# Patient Record
Sex: FEMALE | Race: WHITE | NOT HISPANIC OR LATINO | Employment: FULL TIME | URBAN - METROPOLITAN AREA
[De-identification: names, ages, dates, MRNs, and addresses within clinical notes are randomized per-mention and may not be internally consistent; named-entity substitution may affect disease eponyms.]

---

## 2023-04-02 DIAGNOSIS — M54.50 LOW BACK PAIN WITHOUT SCIATICA, UNSPECIFIED BACK PAIN LATERALITY, UNSPECIFIED CHRONICITY: Primary | ICD-10-CM

## 2023-04-03 RX ORDER — MELOXICAM 7.5 MG/1
TABLET ORAL
Qty: 30 TABLET | Refills: 0 | Status: SHIPPED | OUTPATIENT
Start: 2023-04-03 | End: 2023-06-27 | Stop reason: SDUPTHER

## 2023-06-03 LAB
ALANINE AMINOTRANSFERASE (SGPT) (U/L) IN SER/PLAS: 22 U/L (ref 7–45)
ALBUMIN (G/DL) IN SER/PLAS: 4.7 G/DL (ref 3.4–5)
ALKALINE PHOSPHATASE (U/L) IN SER/PLAS: 98 U/L (ref 33–136)
ANION GAP IN SER/PLAS: 16 MMOL/L (ref 10–20)
ASPARTATE AMINOTRANSFERASE (SGOT) (U/L) IN SER/PLAS: 18 U/L (ref 9–39)
BILIRUBIN TOTAL (MG/DL) IN SER/PLAS: 0.6 MG/DL (ref 0–1.2)
CALCIUM (MG/DL) IN SER/PLAS: 9.8 MG/DL (ref 8.6–10.3)
CARBON DIOXIDE, TOTAL (MMOL/L) IN SER/PLAS: 27 MMOL/L (ref 21–32)
CHLORIDE (MMOL/L) IN SER/PLAS: 104 MMOL/L (ref 98–107)
CHOLESTEROL (MG/DL) IN SER/PLAS: 179 MG/DL (ref 0–199)
CHOLESTEROL IN HDL (MG/DL) IN SER/PLAS: 71.2 MG/DL
CHOLESTEROL/HDL RATIO: 2.5
CREATININE (MG/DL) IN SER/PLAS: 0.89 MG/DL (ref 0.5–1.05)
GFR FEMALE: 68 ML/MIN/1.73M2
GLUCOSE (MG/DL) IN SER/PLAS: 108 MG/DL (ref 74–99)
LDL: 86 MG/DL (ref 0–99)
POTASSIUM (MMOL/L) IN SER/PLAS: 4.7 MMOL/L (ref 3.5–5.3)
PROTEIN TOTAL: 7.7 G/DL (ref 6.4–8.2)
SODIUM (MMOL/L) IN SER/PLAS: 142 MMOL/L (ref 136–145)
TRIGLYCERIDE (MG/DL) IN SER/PLAS: 110 MG/DL (ref 0–149)
UREA NITROGEN (MG/DL) IN SER/PLAS: 17 MG/DL (ref 6–23)
VLDL: 22 MG/DL (ref 0–40)

## 2023-06-27 ENCOUNTER — OFFICE VISIT (OUTPATIENT)
Dept: PRIMARY CARE | Facility: CLINIC | Age: 73
End: 2023-06-27
Payer: MEDICARE

## 2023-06-27 VITALS
HEIGHT: 64 IN | BODY MASS INDEX: 29.74 KG/M2 | HEART RATE: 70 BPM | TEMPERATURE: 98.1 F | WEIGHT: 174.2 LBS | OXYGEN SATURATION: 93 % | DIASTOLIC BLOOD PRESSURE: 75 MMHG | SYSTOLIC BLOOD PRESSURE: 111 MMHG

## 2023-06-27 DIAGNOSIS — M54.50 CHRONIC LEFT-SIDED LOW BACK PAIN WITHOUT SCIATICA: ICD-10-CM

## 2023-06-27 DIAGNOSIS — M54.50 LOW BACK PAIN WITHOUT SCIATICA, UNSPECIFIED BACK PAIN LATERALITY, UNSPECIFIED CHRONICITY: ICD-10-CM

## 2023-06-27 DIAGNOSIS — G89.29 CHRONIC LEFT-SIDED LOW BACK PAIN WITHOUT SCIATICA: ICD-10-CM

## 2023-06-27 DIAGNOSIS — E66.3 OVERWEIGHT (BMI 25.0-29.9): ICD-10-CM

## 2023-06-27 DIAGNOSIS — M25.552 PAIN OF LEFT HIP: ICD-10-CM

## 2023-06-27 DIAGNOSIS — I10 BENIGN ESSENTIAL HYPERTENSION: ICD-10-CM

## 2023-06-27 DIAGNOSIS — E04.1 SOLITARY THYROID NODULE: ICD-10-CM

## 2023-06-27 DIAGNOSIS — R73.01 IMPAIRED FASTING GLUCOSE: Primary | ICD-10-CM

## 2023-06-27 DIAGNOSIS — E78.5 DYSLIPIDEMIA, GOAL LDL BELOW 130: ICD-10-CM

## 2023-06-27 PROBLEM — H61.23 IMPACTED CERUMEN OF BOTH EARS: Status: ACTIVE | Noted: 2023-06-27

## 2023-06-27 PROBLEM — H53.2 DIPLOPIA: Status: ACTIVE | Noted: 2023-06-27

## 2023-06-27 PROBLEM — M25.362 OTHER INSTABILITY, LEFT KNEE: Status: ACTIVE | Noted: 2023-06-27

## 2023-06-27 PROBLEM — M67.52 SYNOVIAL PLICA SYNDROME OF LEFT KNEE: Status: ACTIVE | Noted: 2023-06-27

## 2023-06-27 PROBLEM — R05.9 COUGH: Status: ACTIVE | Noted: 2023-06-27

## 2023-06-27 PROBLEM — H61.21 IMPACTED CERUMEN OF RIGHT EAR: Status: ACTIVE | Noted: 2023-06-27

## 2023-06-27 PROBLEM — H25.10 NUCLEAR SCLEROSIS: Status: ACTIVE | Noted: 2023-06-27

## 2023-06-27 PROBLEM — K21.9 GERD (GASTROESOPHAGEAL REFLUX DISEASE): Status: ACTIVE | Noted: 2023-06-27

## 2023-06-27 PROBLEM — M79.89 MASS OF SOFT TISSUE OF RIGHT UPPER EXTREMITY: Status: ACTIVE | Noted: 2023-06-27

## 2023-06-27 PROBLEM — J01.90 ACUTE SINUSITIS: Status: ACTIVE | Noted: 2023-06-27

## 2023-06-27 PROBLEM — G47.33 OBSTRUCTIVE SLEEP APNEA: Status: ACTIVE | Noted: 2023-06-27

## 2023-06-27 PROBLEM — M20.10 VALGUS DEFORMITY OF GREAT TOE: Status: ACTIVE | Noted: 2023-06-27

## 2023-06-27 PROBLEM — M71.22 SYNOVIAL CYST OF LEFT KNEE: Status: ACTIVE | Noted: 2023-06-27

## 2023-06-27 PROBLEM — S86.919A KNEE STRAIN: Status: ACTIVE | Noted: 2023-06-27

## 2023-06-27 PROBLEM — Z20.822 SUSPECTED COVID-19 VIRUS INFECTION: Status: ACTIVE | Noted: 2023-06-27

## 2023-06-27 PROBLEM — H04.129 DRY EYE SYNDROME: Status: ACTIVE | Noted: 2023-06-27
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
## 2023-06-27 PROBLEM — R06.83 SNORING: Status: ACTIVE | Noted: 2023-06-27

## 2023-06-27 PROBLEM — R19.5 POSITIVE COLORECTAL CANCER SCREENING USING COLOGUARD TEST: Status: ACTIVE | Noted: 2023-06-27

## 2023-06-27 PROBLEM — M25.562 KNEE PAIN, LEFT: Status: ACTIVE | Noted: 2023-06-27

## 2023-06-27 PROBLEM — R53.83 FATIGUE: Status: ACTIVE | Noted: 2023-06-27

## 2023-06-27 PROBLEM — M17.12 PRIMARY OSTEOARTHRITIS OF LEFT KNEE: Status: ACTIVE | Noted: 2023-06-27

## 2023-06-27 PROBLEM — F41.9 ANXIETY: Status: ACTIVE | Noted: 2023-06-27

## 2023-06-27 PROBLEM — M54.32 SCIATICA OF LEFT SIDE: Status: ACTIVE | Noted: 2023-06-27

## 2023-06-27 PROBLEM — E87.5 HYPERKALEMIA: Status: ACTIVE | Noted: 2023-06-27

## 2023-06-27 PROBLEM — M79.676 TOE PAIN, CHRONIC: Status: ACTIVE | Noted: 2023-06-27

## 2023-06-27 PROBLEM — E04.2 MULTINODULAR THYROID: Status: ACTIVE | Noted: 2023-06-27

## 2023-06-27 PROBLEM — H91.93 BILATERAL HEARING LOSS: Status: ACTIVE | Noted: 2023-06-27

## 2023-06-27 PROBLEM — K63.5 COLON POLYP: Status: ACTIVE | Noted: 2023-06-27

## 2023-06-27 PROBLEM — E87.5 HYPERKALEMIA: Status: RESOLVED | Noted: 2023-06-27 | Resolved: 2023-06-27

## 2023-06-27 PROBLEM — R94.120 ABNORMAL OTOACOUSTIC EMISSIONS TEST: Status: ACTIVE | Noted: 2023-06-27

## 2023-06-27 PROBLEM — D12.6 TUBULAR ADENOMA OF COLON: Status: ACTIVE | Noted: 2023-06-27

## 2023-06-27 PROBLEM — E53.8 VITAMIN B12 DEFICIENCY: Status: ACTIVE | Noted: 2023-06-27

## 2023-06-27 PROBLEM — J02.9 SORE THROAT: Status: ACTIVE | Noted: 2023-06-27

## 2023-06-27 PROBLEM — B35.1 NAIL FUNGUS: Status: ACTIVE | Noted: 2023-06-27

## 2023-06-27 PROBLEM — H61.22 IMPACTED CERUMEN OF LEFT EAR: Status: ACTIVE | Noted: 2023-06-27

## 2023-06-27 PROBLEM — H93.8X3 SENSATION OF PLUGGED EAR ON BOTH SIDES: Status: ACTIVE | Noted: 2023-06-27

## 2023-06-27 LAB — POC HEMOGLOBIN A1C: 5.9 % (ref 4.2–6.5)

## 2023-06-27 PROCEDURE — 1160F RVW MEDS BY RX/DR IN RCRD: CPT | Performed by: INTERNAL MEDICINE

## 2023-06-27 PROCEDURE — 1157F ADVNC CARE PLAN IN RCRD: CPT | Performed by: INTERNAL MEDICINE

## 2023-06-27 PROCEDURE — 3078F DIAST BP <80 MM HG: CPT | Performed by: INTERNAL MEDICINE

## 2023-06-27 PROCEDURE — 99214 OFFICE O/P EST MOD 30 MIN: CPT | Performed by: INTERNAL MEDICINE

## 2023-06-27 PROCEDURE — 83036 HEMOGLOBIN GLYCOSYLATED A1C: CPT | Performed by: INTERNAL MEDICINE

## 2023-06-27 PROCEDURE — 3008F BODY MASS INDEX DOCD: CPT | Performed by: INTERNAL MEDICINE

## 2023-06-27 PROCEDURE — 3074F SYST BP LT 130 MM HG: CPT | Performed by: INTERNAL MEDICINE

## 2023-06-27 PROCEDURE — 1159F MED LIST DOCD IN RCRD: CPT | Performed by: INTERNAL MEDICINE

## 2023-06-27 PROCEDURE — 1036F TOBACCO NON-USER: CPT | Performed by: INTERNAL MEDICINE

## 2023-06-27 RX ORDER — ATORVASTATIN CALCIUM 20 MG/1
20 TABLET, FILM COATED ORAL DAILY
COMMUNITY
End: 2023-12-12

## 2023-06-27 RX ORDER — LISINOPRIL 20 MG/1
20 TABLET ORAL DAILY
COMMUNITY
End: 2023-12-20

## 2023-06-27 RX ORDER — TRIAMCINOLONE ACETONIDE 5 MG/G
CREAM TOPICAL
COMMUNITY
Start: 2019-05-15 | End: 2023-10-11

## 2023-06-27 RX ORDER — ASPIRIN 81 MG/1
81 TABLET ORAL DAILY
COMMUNITY
Start: 2017-09-12

## 2023-06-27 RX ORDER — TRIAMCINOLONE ACETONIDE 0.25 MG/G
1 CREAM TOPICAL
COMMUNITY
Start: 2021-12-14

## 2023-06-27 RX ORDER — MELOXICAM 7.5 MG/1
TABLET ORAL
Qty: 30 TABLET | Refills: 2 | Status: SHIPPED | OUTPATIENT
Start: 2023-06-27 | End: 2023-11-15

## 2023-06-27 ASSESSMENT — PATIENT HEALTH QUESTIONNAIRE - PHQ9
2. FEELING DOWN, DEPRESSED OR HOPELESS: NOT AT ALL
1. LITTLE INTEREST OR PLEASURE IN DOING THINGS: NOT AT ALL
SUM OF ALL RESPONSES TO PHQ9 QUESTIONS 1 AND 2: 0

## 2023-06-27 NOTE — ASSESSMENT & PLAN NOTE
Stretching exercise to back as per provided handout.  Take Tylenol and use OTC Lidocaine patch.  Order XRAY.  Can take Meloxicam infrequently,can cause kidney damage,can cause  stomach ulcer and can raise BP.

## 2023-07-03 NOTE — RESULT ENCOUNTER NOTE
Lumbar spine XRAY show degenerative disc disease at multi level and scoliosis,keep same plan as discussed at recent visit,call if pain worsened.  There are retained stools noted,you can add OTC daialy Miralax if you are having constipation and follow high fiber diet.

## 2023-10-06 ENCOUNTER — APPOINTMENT (OUTPATIENT)
Dept: OTOLARYNGOLOGY | Facility: CLINIC | Age: 73
End: 2023-10-06
Payer: MEDICARE

## 2023-10-10 ENCOUNTER — TELEMEDICINE (OUTPATIENT)
Dept: OTOLARYNGOLOGY | Facility: CLINIC | Age: 73
End: 2023-10-10
Payer: MEDICARE

## 2023-10-10 ENCOUNTER — HOSPITAL ENCOUNTER (OUTPATIENT)
Dept: RADIOLOGY | Facility: HOSPITAL | Age: 73
Discharge: HOME | End: 2023-10-10
Payer: MEDICARE

## 2023-10-10 VITALS
HEART RATE: 70 BPM | OXYGEN SATURATION: 95 % | DIASTOLIC BLOOD PRESSURE: 77 MMHG | SYSTOLIC BLOOD PRESSURE: 135 MMHG | RESPIRATION RATE: 18 BRPM

## 2023-10-10 DIAGNOSIS — E04.1 THYROID NODULE GREATER THAN OR EQUAL TO 1 CM IN DIAMETER INCIDENTALLY NOTED ON IMAGING STUDY: ICD-10-CM

## 2023-10-10 DIAGNOSIS — H90.3 SENSORINEURAL HEARING LOSS (SNHL) OF BOTH EARS: Primary | ICD-10-CM

## 2023-10-10 DIAGNOSIS — H91.13 PRESBYCUSIS OF BOTH EARS: ICD-10-CM

## 2023-10-10 DIAGNOSIS — H91.93 HEARING DIFFICULTY OF BOTH EARS: ICD-10-CM

## 2023-10-10 PROCEDURE — 60100 BIOPSY OF THYROID: CPT | Performed by: NURSE PRACTITIONER

## 2023-10-10 PROCEDURE — 76942 ECHO GUIDE FOR BIOPSY: CPT

## 2023-10-10 PROCEDURE — 88173 CYTOPATH EVAL FNA REPORT: CPT | Performed by: PATHOLOGY

## 2023-10-10 PROCEDURE — 99213 OFFICE O/P EST LOW 20 MIN: CPT

## 2023-10-10 PROCEDURE — 88112 CYTOPATH CELL ENHANCE TECH: CPT | Performed by: PATHOLOGY

## 2023-10-10 PROCEDURE — 88173 CYTOPATH EVAL FNA REPORT: CPT | Mod: TC,59 | Performed by: STUDENT IN AN ORGANIZED HEALTH CARE EDUCATION/TRAINING PROGRAM

## 2023-10-10 PROCEDURE — 88173 CYTOPATH EVAL FNA REPORT: CPT | Mod: TC,91 | Performed by: STUDENT IN AN ORGANIZED HEALTH CARE EDUCATION/TRAINING PROGRAM

## 2023-10-10 NOTE — PROGRESS NOTES
Subjective   Patient ID: Brigitte Lovell is a 72 y.o. female.    HPI  Brigitte presents for a virtual visit to review the results of the audiogram performed on 9/27/2023. Audiogram evaluation was indicated for patient's complaint of increased difficulty in discriminating speech when her  was speaking in another room in the house, discussed at our last visit.     Review of Systems  ENT problem-focused review of systems is negative except as noted per HPI.    Objective   Physical Exam  CONSTITUTIONAL: No acute distress, normal facial features; No fever; no chills  VOICE: No hoarseness or other audible abnormality  RESPIRATION: Breathing comfortably, no stridor; normal breathing effort  CV: No cyanosis visible on the face and neck area  EYES: Pupils equal and round; no erythema; conjunctiva clear; sclera white  NEURO: Alert and oriented, able to raise eyebrows symmetrical bilateral, smile with no facial droop, able to swallow  HEAD AND FACE: Symmetric facial features, no masses or lesions Right ear examination: External ear normal. Left ear examination: External ear normal.  NOSE: External nose midline  ORAL CAVITY: No lesions of external lips; uvula is midline; tongue with good mobility; no gross mass in oral cavity; mucosa appears pink  NECK/LYMPH: No obvious deformity or lesions; trachea is midline PSYCH: Alert and oriented with appropriate mood and affect    RESULTS:   I personally reviewed the patient's audiogram today from 9/27/2023 which showed: Normal hearing across all frequencies in bilateral ears through 4000Hz, down sloping to mild bilateral sensorineural hearing loss. Normal tympanogram bilaterally. Excellent word discrimination bilaterally. Acoustic reflexes present right ipsilateral, and left ipsilateral.    Assessment/Plan   -Bilateral sensorineural hearing loss  -Presbycusis of both ears  -Hearing difficulty of both ears    ASSESSMENT:  Patient presents for virtual video visit to review audiogram  results, which revealed mild high-frequency sensorineural hearing loss bilaterally. I personally reviewed audiogram results with the patient. Reassurance provided that audiogram results are most consistent with presbycusis, and at this time, hearing amplification devices are not necessary.     PLAN:  - I recommended that patient modifies communication strategies at home for better communication, such as turning up the volume or closer proximity for conversations.   - I counseled patient to repeat audiogram and follow-up if she experiences any new/worsening otologic symptoms. All questions answered to patient's satisfaction.   -Follow up in 6 months for routine removal of cerumen impaction, as previously scheduled. May follow-up sooner if needed.

## 2023-10-10 NOTE — Clinical Note
Procedure end, Bandage and ice pack applied neck area. No bleeding noted. Patient tolerated procedure without incident.

## 2023-10-10 NOTE — Clinical Note
Patient denies complains at this time. Home going instructions reviewed with patient and patient verbalized understanding.

## 2023-10-11 ENCOUNTER — LAB (OUTPATIENT)
Dept: LAB | Facility: LAB | Age: 73
End: 2023-10-11
Payer: MEDICARE

## 2023-10-11 ENCOUNTER — OFFICE VISIT (OUTPATIENT)
Dept: PRIMARY CARE | Facility: CLINIC | Age: 73
End: 2023-10-11
Payer: MEDICARE

## 2023-10-11 VITALS
OXYGEN SATURATION: 92 % | TEMPERATURE: 97.2 F | HEART RATE: 63 BPM | HEIGHT: 64 IN | WEIGHT: 174.8 LBS | DIASTOLIC BLOOD PRESSURE: 72 MMHG | SYSTOLIC BLOOD PRESSURE: 126 MMHG | BODY MASS INDEX: 29.84 KG/M2

## 2023-10-11 DIAGNOSIS — K63.5 POLYP OF COLON, UNSPECIFIED PART OF COLON, UNSPECIFIED TYPE: ICD-10-CM

## 2023-10-11 DIAGNOSIS — E78.5 DYSLIPIDEMIA, GOAL LDL BELOW 130: ICD-10-CM

## 2023-10-11 DIAGNOSIS — E04.2 MULTINODULAR THYROID: ICD-10-CM

## 2023-10-11 DIAGNOSIS — E04.1 SOLITARY THYROID NODULE: ICD-10-CM

## 2023-10-11 DIAGNOSIS — Z00.00 HEALTHCARE MAINTENANCE: ICD-10-CM

## 2023-10-11 DIAGNOSIS — M51.36 LUMBAR DEGENERATIVE DISC DISEASE: ICD-10-CM

## 2023-10-11 DIAGNOSIS — E04.2 MULTINODULAR THYROID: Primary | ICD-10-CM

## 2023-10-11 DIAGNOSIS — I10 BENIGN ESSENTIAL HYPERTENSION: ICD-10-CM

## 2023-10-11 PROBLEM — L73.9 NASAL FOLLICULITIS: Status: ACTIVE | Noted: 2023-10-11

## 2023-10-11 PROBLEM — J34.89 NASAL DISCOMFORT: Status: ACTIVE | Noted: 2023-10-11

## 2023-10-11 PROBLEM — R49.0 HOARSENESS OF VOICE: Status: ACTIVE | Noted: 2023-10-11

## 2023-10-11 PROBLEM — H90.3 BILATERAL HIGH FREQUENCY SENSORINEURAL HEARING LOSS: Status: ACTIVE | Noted: 2023-10-11

## 2023-10-11 PROBLEM — H91.93 HEARING DIFFICULTY OF BOTH EARS: Status: ACTIVE | Noted: 2023-10-11

## 2023-10-11 PROBLEM — M51.369 LUMBAR DEGENERATIVE DISC DISEASE: Status: ACTIVE | Noted: 2023-10-11

## 2023-10-11 LAB
LABORATORY COMMENT REPORT: NORMAL
LABORATORY COMMENT REPORT: NORMAL
PATH REPORT.FINAL DX SPEC: NORMAL
PATH REPORT.GROSS SPEC: NORMAL
PATH REPORT.TOTAL CANCER: NORMAL
TSH SERPL-ACNC: 0.89 MIU/L (ref 0.44–3.98)

## 2023-10-11 PROCEDURE — 1159F MED LIST DOCD IN RCRD: CPT | Performed by: INTERNAL MEDICINE

## 2023-10-11 PROCEDURE — 90662 IIV NO PRSV INCREASED AG IM: CPT | Performed by: INTERNAL MEDICINE

## 2023-10-11 PROCEDURE — 3074F SYST BP LT 130 MM HG: CPT | Performed by: INTERNAL MEDICINE

## 2023-10-11 PROCEDURE — 3008F BODY MASS INDEX DOCD: CPT | Performed by: INTERNAL MEDICINE

## 2023-10-11 PROCEDURE — 84443 ASSAY THYROID STIM HORMONE: CPT

## 2023-10-11 PROCEDURE — 99214 OFFICE O/P EST MOD 30 MIN: CPT | Performed by: INTERNAL MEDICINE

## 2023-10-11 PROCEDURE — 1126F AMNT PAIN NOTED NONE PRSNT: CPT | Performed by: INTERNAL MEDICINE

## 2023-10-11 PROCEDURE — 36415 COLL VENOUS BLD VENIPUNCTURE: CPT

## 2023-10-11 PROCEDURE — 3078F DIAST BP <80 MM HG: CPT | Performed by: INTERNAL MEDICINE

## 2023-10-11 PROCEDURE — 1160F RVW MEDS BY RX/DR IN RCRD: CPT | Performed by: INTERNAL MEDICINE

## 2023-10-11 PROCEDURE — 1036F TOBACCO NON-USER: CPT | Performed by: INTERNAL MEDICINE

## 2023-10-11 PROCEDURE — G0008 ADMIN INFLUENZA VIRUS VAC: HCPCS | Performed by: INTERNAL MEDICINE

## 2023-10-11 ASSESSMENT — ENCOUNTER SYMPTOMS
CARDIOVASCULAR NEGATIVE: 1
NEUROLOGICAL NEGATIVE: 1
PSYCHIATRIC NEGATIVE: 1
FATIGUE: 1
HEMATOLOGIC/LYMPHATIC NEGATIVE: 1
EYES NEGATIVE: 1
BACK PAIN: 1
GASTROINTESTINAL NEGATIVE: 1
RESPIRATORY NEGATIVE: 1

## 2023-10-11 ASSESSMENT — PATIENT HEALTH QUESTIONNAIRE - PHQ9
1. LITTLE INTEREST OR PLEASURE IN DOING THINGS: NOT AT ALL
2. FEELING DOWN, DEPRESSED OR HOPELESS: NOT AT ALL
SUM OF ALL RESPONSES TO PHQ9 QUESTIONS 1 AND 2: 0

## 2023-10-11 NOTE — ASSESSMENT & PLAN NOTE
Patient had thyroid biopsy on October 10, 2023 report still pending we will check a TSH and she will keep her follow-up appointment with Dr. Carlos Aguilar.

## 2023-10-11 NOTE — ASSESSMENT & PLAN NOTE
She will be due for colonoscopy March 2026.  She will continue high-fiber diet and Dulcolax as needed.

## 2023-10-11 NOTE — ASSESSMENT & PLAN NOTE
Continue stretching exercise.  Continue meloxicam but take infrequently with food and monitor blood pressure while taking it.

## 2023-10-11 NOTE — PROGRESS NOTES
"Subjective   Patient ID: Brigitte Lovell is a 72 y.o. female who presents for 4 month follow up.    This is a 72 year old female here to f/u on HTN,HLD ,IFG,sleep apnea,OA,GERD,h/o colon adenoma,pt sees,derm ,she now see Dr Carlos Aguilar for her thyroid nodule and had Bx yesterday at [facility],Bx report pending.  has been exercising regularly.no CP,SOB.  In past,she saw nutritionist and lost some weight.  In past she saw DR Blue,also saw someone at [facility],she has cataract being monitored..  she c/o fatigue.had sleep studies in 2016 and was \"borderline abnormal\". she self stopped using her C PAP machine,she could not tolerate it,she   she takes her meds regularly.    She c/o her usual pain left low back/left posterior hip x 2 months,on Mobic  with partial improvement,sometimes she takes Advil dual action ,no leg pain or numbness,no injury. Her XRAY showed Spondylolythesis,grade 1 and stools,has been managing with Dulcolax and high fiber diet,she did not like Miralax)caused pasty stools).  Last colonoscopy 3/2021,next in 5 years due to polyps.              Review of Systems   Constitutional:  Positive for fatigue.   HENT: Negative.     Eyes: Negative.    Respiratory: Negative.     Cardiovascular: Negative.    Gastrointestinal: Negative.    Genitourinary: Negative.    Musculoskeletal:  Positive for back pain.   Neurological: Negative.    Hematological: Negative.    Psychiatric/Behavioral: Negative.         Objective   /72 (BP Location: Left arm, Patient Position: Sitting, BP Cuff Size: Adult)   Pulse 63   Temp 36.2 °C (97.2 °F) (Temporal)   Ht 1.626 m (5' 4\")   Wt 79.3 kg (174 lb 12.8 oz)   SpO2 92%   BMI 30.00 kg/m²     Physical Exam  Vitals reviewed.   Constitutional:       Appearance: Normal appearance.   HENT:      Head: Normocephalic and atraumatic.      Right Ear: Tympanic membrane and ear canal normal.      Left Ear: Tympanic membrane and ear canal normal.   Eyes:      Extraocular Movements: " Extraocular movements intact.      Pupils: Pupils are equal, round, and reactive to light.   Cardiovascular:      Rate and Rhythm: Normal rate and regular rhythm.      Pulses: Normal pulses.      Heart sounds: Normal heart sounds. No murmur heard.  Pulmonary:      Effort: Pulmonary effort is normal.      Breath sounds: Normal breath sounds.   Abdominal:      General: Abdomen is flat. Bowel sounds are normal.      Palpations: Abdomen is soft.   Musculoskeletal:         General: Normal range of motion.      Cervical back: Normal range of motion and neck supple.      Right lower leg: No edema.      Left lower leg: No edema.   Neurological:      General: No focal deficit present.      Mental Status: She is alert and oriented to person, place, and time.   Psychiatric:         Mood and Affect: Mood normal.         Assessment/Plan   Problem List Items Addressed This Visit             ICD-10-CM    Benign essential hypertension I10     Stable on current medication continue same .  high-dose flu vaccine given today .  she will return back for Medicare wellness in February.         Colon polyp K63.5     She will be due for colonoscopy March 2026.  She will continue high-fiber diet and Dulcolax as needed.         Dyslipidemia, goal LDL below 130 E78.5     Stable on low-fat diet and statin plan to order labs for February 2024.         Multinodular thyroid - Primary E04.2    Relevant Orders    TSH with reflex to Free T4 if abnormal    Solitary thyroid nodule E04.1     Patient had thyroid biopsy on October 10, 2023 report still pending we will check a TSH and she will keep her follow-up appointment with Dr. Carlos Aguilar.         Healthcare maintenance Z00.00    Relevant Orders    Flu vaccine, quadrivalent, high-dose, preservative free, age 65y+ (FLUZONE) (Completed)    Lumbar degenerative disc disease M51.36     Continue stretching exercise.  Continue meloxicam but take infrequently with food and monitor blood pressure while taking  it.

## 2023-10-11 NOTE — ASSESSMENT & PLAN NOTE
Stable on current medication continue same .  high-dose flu vaccine given today .  she will return back for Medicare wellness in February.

## 2023-11-14 ENCOUNTER — TELEPHONE (OUTPATIENT)
Dept: ENDOCRINOLOGY | Facility: CLINIC | Age: 73
End: 2023-11-14
Payer: MEDICARE

## 2023-11-14 DIAGNOSIS — E04.2 MULTINODULAR THYROID: ICD-10-CM

## 2023-11-14 NOTE — TELEPHONE ENCOUNTER
Brigitte called asking if she should be making a follow-up visit with you after her initial visit in October?  Please advise.  She did'nt hear from us about the TSH but Dr. Jackson ordered it.

## 2023-11-15 DIAGNOSIS — M54.50 LOW BACK PAIN WITHOUT SCIATICA, UNSPECIFIED BACK PAIN LATERALITY, UNSPECIFIED CHRONICITY: ICD-10-CM

## 2023-11-15 RX ORDER — MELOXICAM 7.5 MG/1
TABLET ORAL
Qty: 30 TABLET | Refills: 2 | Status: SHIPPED | OUTPATIENT
Start: 2023-11-15

## 2023-12-12 DIAGNOSIS — E78.5 DYSLIPIDEMIA, GOAL LDL BELOW 130: Primary | ICD-10-CM

## 2023-12-12 RX ORDER — ATORVASTATIN CALCIUM 20 MG/1
20 TABLET, FILM COATED ORAL DAILY
Qty: 90 TABLET | Refills: 0 | Status: SHIPPED | OUTPATIENT
Start: 2023-12-12 | End: 2024-03-25

## 2023-12-19 DIAGNOSIS — I10 BENIGN ESSENTIAL HYPERTENSION: Primary | ICD-10-CM

## 2023-12-20 RX ORDER — LISINOPRIL 20 MG/1
20 TABLET ORAL DAILY
Qty: 90 TABLET | Refills: 2 | Status: SHIPPED | OUTPATIENT
Start: 2023-12-20

## 2024-01-04 ENCOUNTER — TELEPHONE (OUTPATIENT)
Dept: OTOLARYNGOLOGY | Facility: CLINIC | Age: 74
End: 2024-01-04
Payer: MEDICARE

## 2024-01-05 ENCOUNTER — TELEPHONE (OUTPATIENT)
Dept: PRIMARY CARE | Facility: CLINIC | Age: 74
End: 2024-01-05
Payer: MEDICARE

## 2024-01-05 DIAGNOSIS — R05.1 ACUTE COUGH: Primary | ICD-10-CM

## 2024-01-05 RX ORDER — BENZONATATE 200 MG/1
200 CAPSULE ORAL 3 TIMES DAILY PRN
Qty: 42 CAPSULE | Refills: 0 | Status: SHIPPED | OUTPATIENT
Start: 2024-01-05 | End: 2024-02-04

## 2024-01-05 NOTE — TELEPHONE ENCOUNTER
Patient called and said that she tested positive for COVID today and she has a cough. Patient said that the last time she had COVID, you prescribed her Benzonatate and is wondering if this can be prescribed again? Please advise.

## 2024-02-27 ENCOUNTER — APPOINTMENT (OUTPATIENT)
Dept: PRIMARY CARE | Facility: CLINIC | Age: 74
End: 2024-02-27
Payer: MEDICARE

## 2024-03-06 ENCOUNTER — OFFICE VISIT (OUTPATIENT)
Dept: PRIMARY CARE | Facility: CLINIC | Age: 74
End: 2024-03-06
Payer: MEDICARE

## 2024-03-06 VITALS
BODY MASS INDEX: 30.02 KG/M2 | DIASTOLIC BLOOD PRESSURE: 77 MMHG | WEIGHT: 180.2 LBS | OXYGEN SATURATION: 96 % | HEIGHT: 65 IN | SYSTOLIC BLOOD PRESSURE: 115 MMHG | HEART RATE: 67 BPM

## 2024-03-06 DIAGNOSIS — E78.5 DYSLIPIDEMIA, GOAL LDL BELOW 130: ICD-10-CM

## 2024-03-06 DIAGNOSIS — I10 BENIGN ESSENTIAL HYPERTENSION: ICD-10-CM

## 2024-03-06 DIAGNOSIS — K63.5 POLYP OF COLON, UNSPECIFIED PART OF COLON, UNSPECIFIED TYPE: ICD-10-CM

## 2024-03-06 DIAGNOSIS — Z00.00 ROUTINE GENERAL MEDICAL EXAMINATION AT HEALTH CARE FACILITY: ICD-10-CM

## 2024-03-06 DIAGNOSIS — Z12.31 OTHER SCREENING MAMMOGRAM: ICD-10-CM

## 2024-03-06 DIAGNOSIS — E04.2 MULTINODULAR THYROID: ICD-10-CM

## 2024-03-06 DIAGNOSIS — H91.93 BILATERAL HEARING LOSS, UNSPECIFIED HEARING LOSS TYPE: ICD-10-CM

## 2024-03-06 DIAGNOSIS — Z00.00 MEDICARE ANNUAL WELLNESS VISIT, SUBSEQUENT: Primary | ICD-10-CM

## 2024-03-06 DIAGNOSIS — Z00.00 ANNUAL PHYSICAL EXAM: ICD-10-CM

## 2024-03-06 PROBLEM — R19.5 POSITIVE COLORECTAL CANCER SCREENING USING COLOGUARD TEST: Status: RESOLVED | Noted: 2023-06-27 | Resolved: 2024-03-06

## 2024-03-06 PROCEDURE — 1170F FXNL STATUS ASSESSED: CPT | Performed by: INTERNAL MEDICINE

## 2024-03-06 PROCEDURE — 1159F MED LIST DOCD IN RCRD: CPT | Performed by: INTERNAL MEDICINE

## 2024-03-06 PROCEDURE — G0439 PPPS, SUBSEQ VISIT: HCPCS | Performed by: INTERNAL MEDICINE

## 2024-03-06 PROCEDURE — G0442 ANNUAL ALCOHOL SCREEN 15 MIN: HCPCS | Performed by: INTERNAL MEDICINE

## 2024-03-06 PROCEDURE — 3008F BODY MASS INDEX DOCD: CPT | Performed by: INTERNAL MEDICINE

## 2024-03-06 PROCEDURE — 1160F RVW MEDS BY RX/DR IN RCRD: CPT | Performed by: INTERNAL MEDICINE

## 2024-03-06 PROCEDURE — 1158F ADVNC CARE PLAN TLK DOCD: CPT | Performed by: INTERNAL MEDICINE

## 2024-03-06 PROCEDURE — 1126F AMNT PAIN NOTED NONE PRSNT: CPT | Performed by: INTERNAL MEDICINE

## 2024-03-06 PROCEDURE — 1157F ADVNC CARE PLAN IN RCRD: CPT | Performed by: INTERNAL MEDICINE

## 2024-03-06 PROCEDURE — 3074F SYST BP LT 130 MM HG: CPT | Performed by: INTERNAL MEDICINE

## 2024-03-06 PROCEDURE — 99397 PER PM REEVAL EST PAT 65+ YR: CPT | Performed by: INTERNAL MEDICINE

## 2024-03-06 PROCEDURE — 3078F DIAST BP <80 MM HG: CPT | Performed by: INTERNAL MEDICINE

## 2024-03-06 PROCEDURE — 1123F ACP DISCUSS/DSCN MKR DOCD: CPT | Performed by: INTERNAL MEDICINE

## 2024-03-06 PROCEDURE — 1036F TOBACCO NON-USER: CPT | Performed by: INTERNAL MEDICINE

## 2024-03-06 PROCEDURE — G0444 DEPRESSION SCREEN ANNUAL: HCPCS | Performed by: INTERNAL MEDICINE

## 2024-03-06 PROCEDURE — 93000 ELECTROCARDIOGRAM COMPLETE: CPT | Performed by: INTERNAL MEDICINE

## 2024-03-06 PROCEDURE — 99214 OFFICE O/P EST MOD 30 MIN: CPT | Performed by: INTERNAL MEDICINE

## 2024-03-06 RX ORDER — BROMFENAC SODIUM 0.7 MG/ML
SOLUTION/ DROPS OPHTHALMIC
COMMUNITY
Start: 2024-01-16

## 2024-03-06 RX ORDER — TRETINOIN 0.25 MG/G
CREAM TOPICAL
COMMUNITY
Start: 2023-12-13

## 2024-03-06 ASSESSMENT — ENCOUNTER SYMPTOMS
GASTROINTESTINAL NEGATIVE: 1
PSYCHIATRIC NEGATIVE: 1
RESPIRATORY NEGATIVE: 1
CONSTITUTIONAL NEGATIVE: 1
NEUROLOGICAL NEGATIVE: 1
HEMATOLOGIC/LYMPHATIC NEGATIVE: 1
EYES NEGATIVE: 1
CARDIOVASCULAR NEGATIVE: 1

## 2024-03-06 ASSESSMENT — ACTIVITIES OF DAILY LIVING (ADL)
GROCERY_SHOPPING: INDEPENDENT
BATHING: INDEPENDENT
DOING_HOUSEWORK: INDEPENDENT
DRESSING: INDEPENDENT
MANAGING_FINANCES: INDEPENDENT
TAKING_MEDICATION: INDEPENDENT

## 2024-03-06 NOTE — PROGRESS NOTES
"Subjective   Patient ID: Brigitte Lovell is a 73 y.o. female who presents for Medicare Annual Wellness Visit Subsequent, Annual Exam, and Follow-up.    This is a 73 year old female here for MCR,CPE and to f/u on HTN,HLD ,IFG,sleep apnea,OA,GERD,h/o colon adenoma,pt sees,derm ,she now see Dr Carlos Aguilar for her thyroid nodule and had Bx yesterday at [facility],Bx report pending.  has been exercising regularly.no CP,SOB.  In past,she saw nutritionist and lost some weight but she put some on over the winter and over the holidays and birthdays.  In past she saw DR Blue,also saw someone at [facility],she had right cataract surgery recently and will have the left one soon,  She had sleep studies in 2016 and was \"borderline abnormal\". she self stopped using her C PAP machine,she could not tolerate it,she   she takes her meds regularly, no side effects.  She has plantar fasciitis pain, seen in past by podiatrist Dr Moran.    She c/o her usual pain left low back/left posterior hip x 2 months,on Mobic  with partial improvement,sometimes she takes Advil dual action ,no leg pain or numbness,no injury. Her XRAY showed Spondylolythesis,grade 1 and stools,has been managing with Dulcolax and high fiber diet,she did not like Miralax)caused pasty stools).  Last colonoscopy 3/2021,next in 5 years due to polyps.              Review of Systems   Constitutional: Negative.    HENT: Negative.     Eyes: Negative.    Respiratory: Negative.     Cardiovascular: Negative.    Gastrointestinal: Negative.    Genitourinary: Negative.    Musculoskeletal:         As HPI.   Neurological: Negative.    Hematological: Negative.    Psychiatric/Behavioral: Negative.         Objective   /77 (BP Location: Left arm, Patient Position: Sitting, BP Cuff Size: Adult)   Pulse 67   Ht 1.638 m (5' 4.5\")   Wt 81.7 kg (180 lb 3.2 oz)   SpO2 96%   BMI 30.45 kg/m²     Physical Exam  Vitals reviewed.   Constitutional:       General: She is not in acute " distress.     Appearance: Normal appearance.   HENT:      Head: Normocephalic and atraumatic.      Right Ear: Tympanic membrane and ear canal normal. There is no impacted cerumen.      Left Ear: Tympanic membrane and ear canal normal. There is no impacted cerumen.      Nose: No congestion or rhinorrhea.      Mouth/Throat:      Mouth: Mucous membranes are moist.      Pharynx: No oropharyngeal exudate or posterior oropharyngeal erythema.   Eyes:      Extraocular Movements: Extraocular movements intact.      Pupils: Pupils are equal, round, and reactive to light.   Neck:      Vascular: No carotid bruit.   Cardiovascular:      Rate and Rhythm: Normal rate and regular rhythm.      Pulses: Normal pulses.      Heart sounds: Normal heart sounds. No murmur heard.  Pulmonary:      Effort: Pulmonary effort is normal. No respiratory distress.      Breath sounds: Normal breath sounds.   Chest:   Breasts:     Right: Normal. No mass, nipple discharge, skin change or tenderness.      Left: Normal. No mass, nipple discharge, skin change or tenderness.   Abdominal:      General: Abdomen is flat. Bowel sounds are normal.      Palpations: Abdomen is soft.   Musculoskeletal:         General: Normal range of motion.      Cervical back: Normal range of motion and neck supple.   Lymphadenopathy:      Cervical: No cervical adenopathy.      Upper Body:      Right upper body: No supraclavicular or axillary adenopathy.      Left upper body: No supraclavicular or axillary adenopathy.   Neurological:      General: No focal deficit present.      Mental Status: She is alert and oriented to person, place, and time.   Psychiatric:         Mood and Affect: Mood normal.         Assessment/Plan   Problem List Items Addressed This Visit             ICD-10-CM    Benign essential hypertension I10     Stable on current medication.         Relevant Orders    ECG 12 lead (Clinic Performed)    CBC    Comprehensive Metabolic Panel    Bilateral hearing loss  H91.93     Seen by audiologist in past told due to age no hearing aid needed.         Colon polyp K63.5     Close to be up-to-date next colonoscopy in 2026.         Dyslipidemia, goal LDL below 130 E78.5     Continue statin and low-fat diet order labs.           Relevant Orders    ECG 12 lead (Clinic Performed)    Lipid Panel    Multinodular thyroid E04.2    Medicare annual wellness visit, subsequent - Primary Z00.00    Annual physical exam Z00.00     Complete physical examination completed today.  Advised to keep a heart healthy, low-fat diet as recommended diet is the Mediterranean diet.  Advised to exercise regularly for 30 minutes daily 5 days a week and maintain 150 minutes of exercise per week.  Discussed age-appropriate cancer screening,Immunization and recommendation were given.  Advised on regular eye and dental visit.  Advised on staying well-hydrated.  I recommend RSV vaccine.           Other screening mammogram Z12.31    Relevant Orders    BI mammo bilateral screening tomosynthesis     Other Visit Diagnoses         Codes    Routine general medical examination at health care facility     Z00.00

## 2024-03-06 NOTE — ASSESSMENT & PLAN NOTE
Complete physical examination completed today.  Advised to keep a heart healthy, low-fat diet as recommended diet is the Mediterranean diet.  Advised to exercise regularly for 30 minutes daily 5 days a week and maintain 150 minutes of exercise per week.  Discussed age-appropriate cancer screening,Immunization and recommendation were given.  Advised on regular eye and dental visit.  Advised on staying well-hydrated.  I recommend RSV vaccine.

## 2024-03-23 DIAGNOSIS — E78.5 DYSLIPIDEMIA, GOAL LDL BELOW 130: ICD-10-CM

## 2024-03-25 RX ORDER — ATORVASTATIN CALCIUM 20 MG/1
20 TABLET, FILM COATED ORAL DAILY
Qty: 90 TABLET | Refills: 3 | Status: SHIPPED | OUTPATIENT
Start: 2024-03-25

## 2024-03-28 ENCOUNTER — APPOINTMENT (OUTPATIENT)
Dept: OTOLARYNGOLOGY | Facility: CLINIC | Age: 74
End: 2024-03-28
Payer: MEDICARE

## 2024-04-06 ENCOUNTER — HOSPITAL ENCOUNTER (OUTPATIENT)
Dept: RADIOLOGY | Facility: CLINIC | Age: 74
Discharge: HOME | End: 2024-04-06
Payer: MEDICARE

## 2024-04-06 VITALS — WEIGHT: 180.12 LBS | BODY MASS INDEX: 30.75 KG/M2 | HEIGHT: 64 IN

## 2024-04-06 DIAGNOSIS — Z12.31 OTHER SCREENING MAMMOGRAM: ICD-10-CM

## 2024-04-06 PROCEDURE — 77067 SCR MAMMO BI INCL CAD: CPT

## 2024-04-06 PROCEDURE — 77063 BREAST TOMOSYNTHESIS BI: CPT | Performed by: RADIOLOGY

## 2024-04-06 PROCEDURE — 77067 SCR MAMMO BI INCL CAD: CPT | Performed by: RADIOLOGY

## 2024-04-08 ENCOUNTER — APPOINTMENT (OUTPATIENT)
Dept: RADIOLOGY | Facility: CLINIC | Age: 74
End: 2024-04-08
Payer: MEDICARE

## 2024-04-29 ENCOUNTER — APPOINTMENT (OUTPATIENT)
Dept: OTOLARYNGOLOGY | Facility: CLINIC | Age: 74
End: 2024-04-29
Payer: MEDICARE

## 2024-05-01 ENCOUNTER — PATIENT MESSAGE (OUTPATIENT)
Dept: PRIMARY CARE | Facility: CLINIC | Age: 74
End: 2024-05-01
Payer: MEDICARE

## 2024-05-01 ENCOUNTER — TELEPHONE (OUTPATIENT)
Dept: PRIMARY CARE | Facility: CLINIC | Age: 74
End: 2024-05-01
Payer: MEDICARE

## 2024-05-01 DIAGNOSIS — M79.672 LEFT FOOT PAIN: Primary | ICD-10-CM

## 2024-05-01 NOTE — TELEPHONE ENCOUNTER
I called patient no answer left a message, it could be plantar fasciitis since her pain is worse first thing in the morning, advised to wear shoes with good arch support, take Advil but monitor blood pressure while taking it, do stretching exercise and to call me if it is not better.

## 2024-05-01 NOTE — TELEPHONE ENCOUNTER
Pt called complaining of left foot/ankle started yesterday. Pt has been icing not in a lot of pain. Only 1st thing in the morning, does she have pain. Thought maybe it was plantar fasciitis?  No injury.     Please call pt at  [phone] and advise

## 2024-05-02 ENCOUNTER — TELEPHONE (OUTPATIENT)
Dept: PRIMARY CARE | Facility: CLINIC | Age: 74
End: 2024-05-02
Payer: MEDICARE

## 2024-05-02 NOTE — TELEPHONE ENCOUNTER
Patient would like X-ray on both feet she advised that she has been using Advil for pain and ice on feet. Patient has been in pain this has been going on for 6 weeks.

## 2024-05-05 NOTE — TELEPHONE ENCOUNTER
Called patient in regards to her [employer] Message to obtain more information. Patient states she started to have facial pain, congestion and a low grade fever. Patient instructed this could be viral and she could take a COVID test to rule out. Suggested over the counter fever reducers. Should contact her PCP or go to urgent care if her symptoms do not improve.     ----- Message from Brigitte Lovell sent at 1/4/2024  8:42 AM EST -----  Regarding: Sinus Infection  Contact: [phone]  My face hurts and I ache. Is that a sinus infection?  And what Can I take for it.   by MD/DC instructions

## 2024-05-08 NOTE — ADDENDUM NOTE
Encounter addended by: German Gamez on: 5/8/2024 8:35 AM   Actions taken: Charge Capture section accepted Rituxan Pregnancy And Lactation Text: This medication is Pregnancy Category C and it isn't know if it is safe during pregnancy. It is unknown if this medication is excreted in breast milk but similar antibodies are known to be excreted.

## 2024-05-09 ENCOUNTER — HOSPITAL ENCOUNTER (OUTPATIENT)
Dept: RADIOLOGY | Facility: CLINIC | Age: 74
Discharge: HOME | End: 2024-05-09
Payer: MEDICARE

## 2024-05-09 DIAGNOSIS — M79.672 LEFT FOOT PAIN: ICD-10-CM

## 2024-05-09 PROCEDURE — 73630 X-RAY EXAM OF FOOT: CPT | Mod: LEFT SIDE | Performed by: RADIOLOGY

## 2024-05-09 PROCEDURE — 73630 X-RAY EXAM OF FOOT: CPT | Mod: LT

## 2024-05-11 ENCOUNTER — LAB (OUTPATIENT)
Dept: LAB | Facility: LAB | Age: 74
End: 2024-05-11
Payer: MEDICARE

## 2024-05-11 DIAGNOSIS — E78.5 DYSLIPIDEMIA, GOAL LDL BELOW 130: ICD-10-CM

## 2024-05-11 DIAGNOSIS — I10 BENIGN ESSENTIAL HYPERTENSION: ICD-10-CM

## 2024-05-11 LAB
ALBUMIN SERPL BCP-MCNC: 4.3 G/DL (ref 3.4–5)
ALP SERPL-CCNC: 110 U/L (ref 33–136)
ALT SERPL W P-5'-P-CCNC: 34 U/L (ref 7–45)
ANION GAP SERPL CALC-SCNC: 15 MMOL/L (ref 10–20)
AST SERPL W P-5'-P-CCNC: 25 U/L (ref 9–39)
BILIRUB SERPL-MCNC: 0.5 MG/DL (ref 0–1.2)
BUN SERPL-MCNC: 16 MG/DL (ref 6–23)
CALCIUM SERPL-MCNC: 9.6 MG/DL (ref 8.6–10.6)
CHLORIDE SERPL-SCNC: 106 MMOL/L (ref 98–107)
CHOLEST SERPL-MCNC: 179 MG/DL (ref 0–199)
CHOLESTEROL/HDL RATIO: 2.6
CO2 SERPL-SCNC: 25 MMOL/L (ref 21–32)
CREAT SERPL-MCNC: 0.83 MG/DL (ref 0.5–1.05)
EGFRCR SERPLBLD CKD-EPI 2021: 74 ML/MIN/1.73M*2
ERYTHROCYTE [DISTWIDTH] IN BLOOD BY AUTOMATED COUNT: 12.9 % (ref 11.5–14.5)
GLUCOSE SERPL-MCNC: 102 MG/DL (ref 74–99)
HCT VFR BLD AUTO: 45.2 % (ref 36–46)
HDLC SERPL-MCNC: 68.2 MG/DL
HGB BLD-MCNC: 14 G/DL (ref 12–16)
LDLC SERPL CALC-MCNC: 81 MG/DL
MCH RBC QN AUTO: 29.9 PG (ref 26–34)
MCHC RBC AUTO-ENTMCNC: 31 G/DL (ref 32–36)
MCV RBC AUTO: 96 FL (ref 80–100)
NON HDL CHOLESTEROL: 111 MG/DL (ref 0–149)
NRBC BLD-RTO: 0 /100 WBCS (ref 0–0)
PLATELET # BLD AUTO: 298 X10*3/UL (ref 150–450)
POTASSIUM SERPL-SCNC: 4.8 MMOL/L (ref 3.5–5.3)
PROT SERPL-MCNC: 7 G/DL (ref 6.4–8.2)
RBC # BLD AUTO: 4.69 X10*6/UL (ref 4–5.2)
SODIUM SERPL-SCNC: 141 MMOL/L (ref 136–145)
TRIGL SERPL-MCNC: 148 MG/DL (ref 0–149)
VLDL: 30 MG/DL (ref 0–40)
WBC # BLD AUTO: 6.4 X10*3/UL (ref 4.4–11.3)

## 2024-05-11 PROCEDURE — 80053 COMPREHEN METABOLIC PANEL: CPT

## 2024-05-11 PROCEDURE — 85027 COMPLETE CBC AUTOMATED: CPT

## 2024-05-11 PROCEDURE — 80061 LIPID PANEL: CPT

## 2024-05-11 PROCEDURE — 36415 COLL VENOUS BLD VENIPUNCTURE: CPT

## 2024-05-13 DIAGNOSIS — M79.671 RIGHT FOOT PAIN: Primary | ICD-10-CM

## 2024-05-16 ENCOUNTER — HOSPITAL ENCOUNTER (OUTPATIENT)
Dept: RADIOLOGY | Facility: CLINIC | Age: 74
Discharge: HOME | End: 2024-05-16
Payer: MEDICARE

## 2024-05-16 DIAGNOSIS — M79.671 RIGHT FOOT PAIN: ICD-10-CM

## 2024-05-16 PROCEDURE — 73630 X-RAY EXAM OF FOOT: CPT | Mod: RT

## 2024-05-16 PROCEDURE — 73630 X-RAY EXAM OF FOOT: CPT | Mod: RIGHT SIDE | Performed by: RADIOLOGY

## 2024-05-30 ENCOUNTER — OFFICE VISIT (OUTPATIENT)
Dept: OTOLARYNGOLOGY | Facility: CLINIC | Age: 74
End: 2024-05-30
Payer: MEDICARE

## 2024-05-30 VITALS
WEIGHT: 185 LBS | SYSTOLIC BLOOD PRESSURE: 120 MMHG | HEIGHT: 65 IN | DIASTOLIC BLOOD PRESSURE: 77 MMHG | BODY MASS INDEX: 30.82 KG/M2 | TEMPERATURE: 97.8 F

## 2024-05-30 DIAGNOSIS — H93.8X3 SENSATION OF PLUGGED EAR ON BOTH SIDES: ICD-10-CM

## 2024-05-30 DIAGNOSIS — H61.23 IMPACTED CERUMEN OF BOTH EARS: Primary | ICD-10-CM

## 2024-05-30 ASSESSMENT — PATIENT HEALTH QUESTIONNAIRE - PHQ9
2. FEELING DOWN, DEPRESSED OR HOPELESS: NOT AT ALL
SUM OF ALL RESPONSES TO PHQ9 QUESTIONS 1 AND 2: 0
1. LITTLE INTEREST OR PLEASURE IN DOING THINGS: NOT AT ALL

## 2024-05-30 NOTE — PROGRESS NOTES
Patient ID: Brigitte Lovell is a 73 y.o. female who presents for a follow up assessment of earwax removal.     PROVIDER IMPRESSIONS:  DIAGNOSES/PROBLEMS:  - Cerumen impaction of both ears   - a plugged sensation in both ears     ASSESSMENT: Brigitte Lovell is a 73 y.o. female who presents for a follow up encounter with symptoms and clinical findings that are consistent with bilateral cerumen impaction. Using appropriate instrumentation, impacted cerumen was successfully removed from the affected EAC(s). Reassurance provided to patient that otologic exam today revealed no evidence of acute infection/inflammation in the EAC bilaterally and that TM appears with no evidence of infection, effusion, retraction or perforation bilaterally.      PLAN:   -I counseled patient on safe interventions for cerumen management at home. Patient may wash the external ear with a cloth. I reinforced education to the patient that they should avoid using cotton tipped applicators, tissues, paper, or any rigid object in the ear canal. I explained to the patient that doing so may cause wax to be pushed back into the ear canal and stuck and also risks injury to the ear canal and ear drum.   -Follow-up: Patient may schedule for routine evaluation for the removal of cerumen impaction in 6 months. They may follow up with me as requested, sooner if needed. All questions answered to patient satisfaction.    Subjective    HPI: Brigitte Lovell is a 73 y.o. female self-referred for clinical evaluation of a plugged sensation in both ears and with the request for routine earwax removal.     RECALL 10/10/24:  HPI: Brigitte presents for a virtual visit to review the results of the audiogram performed on 9/27/2023. Audiogram evaluation was indicated for patient's complaint of increased difficulty in discriminating speech when her  was speaking in another room in the house, discussed at our last visit.   ASSESSMENT: Patient presents for virtual  video visit to review audiogram results, which revealed mild high-frequency sensorineural hearing loss bilaterally. I personally reviewed audiogram results with the patient. Reassurance provided that audiogram results are most consistent with presbycusis, and at this time, hearing amplification devices are not necessary.   PLAN: I recommended that patient modifies communication strategies at home for better communication, such as turning up the volume or closer proximity for conversations. I counseled patient to repeat audiogram and follow-up if she experiences any new/worsening otologic symptoms. All questions answered to patient's satisfaction. Follow up in 6 months for routine removal of cerumen impaction, as previously scheduled. May follow-up sooner if needed.    RECALL 9/27/23:  HPI: KANDICE WILSON is a 72 year -old female who presents for earwax cleaning prior to scheduled audiogram for today. When asked about the presence of otologic symptoms, such as ear pain, difficulty hearing, ear itching, discharge from ear, tinnitus, aural fullness or autophony, the patient admits to difficulty hearing and specifically discriminating speech in both ears. She also mentions some occasional dizziness, describing as dysequilibrium that lasts for a few hours and resolves when she lies down. The patient denies placing Q-tips or foreign objects into their ear canals. When asked about any current or previous use of hearing aids, the patient admits to none.When asked about a significant past otological history, including any previous ear procedures, ear injuries, harmful noise exposure, exposure to ototoxic drugs or agents, and/or a family history of hearing loss, the patient admits to none.  Assessment: 72 year old female presents for symptoms and clinical findings are consistent with the following: Bilateral Cerumen Impaction, Subsequent encounter; Hearing difficulty in both ears.   Plan: Using appropriate instrumentation,  cerumen was successfully removed from the ear canal(s). Reassurance given that today's otologic exam was normal. All questions answered to patient satisfaction. Follow-up: Patient would like to schedule a virtual visit to discuss audiogram results from today. Patient would like to schedule for routine office visits for removal of impacted cerumen every 6 months. She may follow up with me as requested, sooner if needed.     REVIEW OF SYSTEMS:  All other systems negative.    Objective   Physical Exam:  Right Ear: External inspection of ear with no deformity, scars, or masses. EAC is completely impacted with cerumen. Unable to visualize tympanic membrane.  Left Ear: External inspection of ear with no deformity, scars, or masses. EAC is completely impacted with cerumen. Unable to visualize tympanic membrane.   Neurologic: Cranial nerves II-XII grossly intact and symmetric bilaterally.  Head and Face:  Head: Atraumatic with no masses, lesions or scarring.  Face: Normal symmetry. No scars or deformities.  Eyes: Conjunctiva not edematous or erythematous.   Nose: External inspection of nose: No nasal lesions, lacerations or scars.   Neck: Normal appearing, symmetric, trachea midline.   Cardiovascular: Examination of peripheral vascular system shows no clubbing or cyanosis.  Respiratory: No respiratory distress increased work of breathing. Inspection of the chest with symmetric chest expansion and normal respiratory effort.  Skin: No head and neck rashes.  Lymph nodes: No adenopathy.     EAR CLEANING PROCEDURE NOTE:  Indication: Cerumen impaction  Location: bilateral ear canals  Procedure Note: The procedure was performed by the provider.  Visualization Instrument: A microscope with a #4 speculum was placed in the ear canals to visualize the ear canal debris.  Ear Cleaning Instrument and Outcome: Using the 5/7 suction and alligator forceps, a large amount of soft, yellow cerumen was removed from the impacted EAC(s).  After  cleaning: TM intact bilaterally without evidence of effusion, retraction, infection, or perforation. EACs clear and appear narrow.   Patient Status: The patient tolerated the procedure well.  Complications: There were no complications.

## 2024-06-25 ENCOUNTER — APPOINTMENT (OUTPATIENT)
Dept: PRIMARY CARE | Facility: CLINIC | Age: 74
End: 2024-06-25
Payer: MEDICARE

## 2024-06-25 VITALS
TEMPERATURE: 98 F | RESPIRATION RATE: 16 BRPM | HEART RATE: 65 BPM | SYSTOLIC BLOOD PRESSURE: 130 MMHG | OXYGEN SATURATION: 98 % | WEIGHT: 182.6 LBS | BODY MASS INDEX: 30.39 KG/M2 | DIASTOLIC BLOOD PRESSURE: 70 MMHG

## 2024-06-25 DIAGNOSIS — N18.31 STAGE 3A CHRONIC KIDNEY DISEASE (MULTI): ICD-10-CM

## 2024-06-25 DIAGNOSIS — R06.09 EXERTIONAL DYSPNEA: ICD-10-CM

## 2024-06-25 DIAGNOSIS — I10 BENIGN ESSENTIAL HYPERTENSION: Primary | ICD-10-CM

## 2024-06-25 DIAGNOSIS — R73.01 IMPAIRED FASTING GLUCOSE: ICD-10-CM

## 2024-06-25 DIAGNOSIS — E78.5 DYSLIPIDEMIA, GOAL LDL BELOW 130: ICD-10-CM

## 2024-06-25 PROBLEM — J01.90 ACUTE SINUSITIS: Status: RESOLVED | Noted: 2023-06-27 | Resolved: 2024-06-25

## 2024-06-25 PROBLEM — Z86.69 HISTORY OF GLAUCOMA: Status: ACTIVE | Noted: 2024-06-25

## 2024-06-25 PROBLEM — N18.30 STAGE 3 CHRONIC KIDNEY DISEASE (MULTI): Status: ACTIVE | Noted: 2024-06-25

## 2024-06-25 PROBLEM — H93.8X9 SENSATION OF PLUGGED EAR: Status: ACTIVE | Noted: 2024-06-25

## 2024-06-25 PROBLEM — Z86.39 HISTORY OF ELEVATED LIPIDS: Status: ACTIVE | Noted: 2024-06-25

## 2024-06-25 PROBLEM — Z86.79 HISTORY OF HYPERTENSION: Status: ACTIVE | Noted: 2024-06-25

## 2024-06-25 LAB — POC HEMOGLOBIN A1C: 6.1 % (ref 4.2–6.5)

## 2024-06-25 PROCEDURE — 1157F ADVNC CARE PLAN IN RCRD: CPT | Performed by: INTERNAL MEDICINE

## 2024-06-25 PROCEDURE — 1159F MED LIST DOCD IN RCRD: CPT | Performed by: INTERNAL MEDICINE

## 2024-06-25 PROCEDURE — 3078F DIAST BP <80 MM HG: CPT | Performed by: INTERNAL MEDICINE

## 2024-06-25 PROCEDURE — 1036F TOBACCO NON-USER: CPT | Performed by: INTERNAL MEDICINE

## 2024-06-25 PROCEDURE — 99214 OFFICE O/P EST MOD 30 MIN: CPT | Performed by: INTERNAL MEDICINE

## 2024-06-25 PROCEDURE — 83036 HEMOGLOBIN GLYCOSYLATED A1C: CPT | Performed by: INTERNAL MEDICINE

## 2024-06-25 PROCEDURE — 1160F RVW MEDS BY RX/DR IN RCRD: CPT | Performed by: INTERNAL MEDICINE

## 2024-06-25 PROCEDURE — 3075F SYST BP GE 130 - 139MM HG: CPT | Performed by: INTERNAL MEDICINE

## 2024-06-25 PROCEDURE — 1123F ACP DISCUSS/DSCN MKR DOCD: CPT | Performed by: INTERNAL MEDICINE

## 2024-06-25 ASSESSMENT — ENCOUNTER SYMPTOMS
EYES NEGATIVE: 1
PSYCHIATRIC NEGATIVE: 1
CONSTITUTIONAL NEGATIVE: 1
GASTROINTESTINAL NEGATIVE: 1
NEUROLOGICAL NEGATIVE: 1
CARDIOVASCULAR NEGATIVE: 1
HEMATOLOGIC/LYMPHATIC NEGATIVE: 1
RESPIRATORY NEGATIVE: 1

## 2024-06-25 ASSESSMENT — PATIENT HEALTH QUESTIONNAIRE - PHQ9
SUM OF ALL RESPONSES TO PHQ9 QUESTIONS 1 AND 2: 0
1. LITTLE INTEREST OR PLEASURE IN DOING THINGS: NOT AT ALL
2. FEELING DOWN, DEPRESSED OR HOPELESS: NOT AT ALL

## 2024-06-25 NOTE — PROGRESS NOTES
"Subjective   Patient ID: Brigitte Lovell is a 73 y.o. female who presents for Follow-up (Patient is here for a 4-5 month follow up. Patient complains of back pain that has been occurring for approximately 1 week. ).    Patient is here to f/u on HTN,HLD ,IFG,sleep apnea,OA,GERD,h/o colon adenoma,pt sees,derm ,she now see Dr Carlos Aguilar for her thyroid nodule and had Bx  at [facility],Bx report pending.  Her feet pain improved.  has been exercising regularly.no CP, but when she go on the stationary bike fast she might feel short of breath with extreme exertion but no chest pain, she is known to have history of mitral valve prolapse, she did have cardiac calcium scoring 21 and was low, she takes her statin regularly.  In past,she saw nutritionist and lost some weight but she put some on over the winter and over the holidays and birthdays.  In past she saw DR Blue,also saw someone at [facility] [place],she had right cataract surgery recently and will have the left one soon,  She had sleep studies in 2016 and was \"borderline abnormal\". she self stopped using her C PAP machine,she could not tolerate it,she   she takes her meds regularly, no side effects.  She has plantar fasciitis pain, seen in past by podiatrist Dr Moran.    She c/o her usual pain left low back/left posterior hip x 2 months,on Mobic  with partial improvement,sometimes she takes Advil dual action ,no leg pain or numbness,no injury. Her XRAY showed Spondylolythesis,grade 1 and stools,has been managing with Dulcolax and high fiber diet,she did not like Miralax)caused pasty stools).  Last colonoscopy 3/2021,next in 5 years due to polyps.            Review of Systems   Constitutional: Negative.    HENT: Negative.     Eyes: Negative.    Respiratory: Negative.     Cardiovascular: Negative.    Gastrointestinal: Negative.    Genitourinary: Negative.    Musculoskeletal:         As HPI   Neurological: Negative.    Hematological: Negative.    Psychiatric/Behavioral: " Negative.         Objective   /70 (BP Location: Left arm, Patient Position: Sitting, BP Cuff Size: Adult)   Pulse 65   Temp 36.7 °C (98 °F) (Temporal)   Resp 16   Wt 82.8 kg (182 lb 9.6 oz)   SpO2 98%   BMI 30.39 kg/m²     Physical Exam  Vitals reviewed.   Constitutional:       Appearance: Normal appearance.   HENT:      Head: Normocephalic and atraumatic.      Right Ear: Tympanic membrane and ear canal normal.      Left Ear: Tympanic membrane and ear canal normal.   Eyes:      Extraocular Movements: Extraocular movements intact.      Pupils: Pupils are equal, round, and reactive to light.   Cardiovascular:      Rate and Rhythm: Normal rate and regular rhythm.      Pulses: Normal pulses.      Heart sounds: Normal heart sounds. No murmur heard.  Pulmonary:      Effort: Pulmonary effort is normal.      Breath sounds: Normal breath sounds.   Abdominal:      General: Abdomen is flat. Bowel sounds are normal.      Palpations: Abdomen is soft.   Musculoskeletal:      Cervical back: Normal range of motion and neck supple.      Right lower leg: No edema.      Left lower leg: No edema.      Comments: Back pain triggered with changing position.  Feet hallux valgus and OA changes.   Neurological:      General: No focal deficit present.      Mental Status: She is alert and oriented to person, place, and time.   Psychiatric:         Mood and Affect: Mood normal.         Assessment/Plan   Problem List Items Addressed This Visit             ICD-10-CM    Benign essential hypertension - Primary I10     Stable on current medication.  FOLLOW UP IN 4 MONTHS.         Dyslipidemia, goal LDL below 130 E78.5     Continue statin and low-fat diet order labs.           Impaired fasting glucose R73.01     Check A1C today.  Follow 1800 nicole ADA diet.         Relevant Orders    POCT glycosylated hemoglobin (Hb A1C) manually resulted (Completed)    Stage 3 chronic kidney disease (Multi) N18.30     GFR abnormal, avoid NSAID intake.          Exertional dyspnea R06.09    Relevant Orders    Transthoracic Echo (TTE) Complete           DISPLAY PLAN FREE TEXT

## 2024-07-01 ENCOUNTER — TELEPHONE (OUTPATIENT)
Dept: PRIMARY CARE | Facility: CLINIC | Age: 74
End: 2024-07-01
Payer: MEDICARE

## 2024-07-01 NOTE — TELEPHONE ENCOUNTER
Patient requesting jury duty letter     (Letter complete)    Please call patient when signed she will

## 2024-07-19 DIAGNOSIS — M54.50 LOW BACK PAIN WITHOUT SCIATICA, UNSPECIFIED BACK PAIN LATERALITY, UNSPECIFIED CHRONICITY: ICD-10-CM

## 2024-07-19 RX ORDER — MELOXICAM 7.5 MG/1
TABLET ORAL
Qty: 30 TABLET | Refills: 0 | Status: SHIPPED | OUTPATIENT
Start: 2024-07-19 | End: 2024-07-19 | Stop reason: SDUPTHER

## 2024-07-19 RX ORDER — MELOXICAM 7.5 MG/1
TABLET ORAL
Qty: 90 TABLET | Refills: 3 | Status: SHIPPED | OUTPATIENT
Start: 2024-07-19

## 2024-07-25 ENCOUNTER — HOSPITAL ENCOUNTER (OUTPATIENT)
Dept: CARDIOLOGY | Facility: CLINIC | Age: 74
Discharge: HOME | End: 2024-07-25
Payer: MEDICARE

## 2024-07-25 DIAGNOSIS — R06.09 EXERTIONAL DYSPNEA: ICD-10-CM

## 2024-07-25 DIAGNOSIS — I35.0 NONRHEUMATIC AORTIC VALVE STENOSIS: Primary | ICD-10-CM

## 2024-07-25 DIAGNOSIS — I35.1 NONRHEUMATIC AORTIC VALVE INSUFFICIENCY: ICD-10-CM

## 2024-07-25 LAB
AORTIC VALVE MEAN GRADIENT: 15.3 MMHG
AORTIC VALVE PEAK VELOCITY: 2.52 M/S
AV PEAK GRADIENT: 25.4 MMHG
AVA (PEAK VEL): 1.48 CM2
AVA (VTI): 1.38 CM2
EJECTION FRACTION APICAL 4 CHAMBER: 61.7
EJECTION FRACTION: 65 %
LEFT ATRIUM VOLUME AREA LENGTH INDEX BSA: 32.2 ML/M2
LEFT VENTRICLE INTERNAL DIMENSION DIASTOLE: 3.91 CM (ref 3.5–6)
LEFT VENTRICULAR OUTFLOW TRACT DIAMETER: 2.03 CM
MITRAL VALVE E/A RATIO: 0.85
MITRAL VALVE E/E' RATIO: 15
RIGHT VENTRICLE FREE WALL PEAK S': 14 CM/S
RIGHT VENTRICLE PEAK SYSTOLIC PRESSURE: 33 MMHG
TRICUSPID ANNULAR PLANE SYSTOLIC EXCURSION: 2.3 CM

## 2024-07-25 PROCEDURE — 93306 TTE W/DOPPLER COMPLETE: CPT

## 2024-08-01 ENCOUNTER — HOSPITAL ENCOUNTER (OUTPATIENT)
Dept: RADIOLOGY | Facility: HOSPITAL | Age: 74
Discharge: HOME | End: 2024-08-01
Payer: MEDICARE

## 2024-08-01 DIAGNOSIS — E04.2 MULTINODULAR THYROID: ICD-10-CM

## 2024-08-01 PROCEDURE — 76536 US EXAM OF HEAD AND NECK: CPT

## 2024-08-15 ENCOUNTER — APPOINTMENT (OUTPATIENT)
Dept: ENDOCRINOLOGY | Facility: CLINIC | Age: 74
End: 2024-08-15
Payer: MEDICARE

## 2024-08-15 VITALS
BODY MASS INDEX: 30.16 KG/M2 | DIASTOLIC BLOOD PRESSURE: 62 MMHG | WEIGHT: 181 LBS | HEIGHT: 65 IN | SYSTOLIC BLOOD PRESSURE: 110 MMHG

## 2024-08-15 DIAGNOSIS — E89.0 S/P PARTIAL THYROIDECTOMY: ICD-10-CM

## 2024-08-15 DIAGNOSIS — E04.2 MULTINODULAR THYROID: Primary | ICD-10-CM

## 2024-08-15 PROCEDURE — 3008F BODY MASS INDEX DOCD: CPT | Performed by: STUDENT IN AN ORGANIZED HEALTH CARE EDUCATION/TRAINING PROGRAM

## 2024-08-15 PROCEDURE — 1159F MED LIST DOCD IN RCRD: CPT | Performed by: STUDENT IN AN ORGANIZED HEALTH CARE EDUCATION/TRAINING PROGRAM

## 2024-08-15 PROCEDURE — 99214 OFFICE O/P EST MOD 30 MIN: CPT | Performed by: STUDENT IN AN ORGANIZED HEALTH CARE EDUCATION/TRAINING PROGRAM

## 2024-08-15 PROCEDURE — 1157F ADVNC CARE PLAN IN RCRD: CPT | Performed by: STUDENT IN AN ORGANIZED HEALTH CARE EDUCATION/TRAINING PROGRAM

## 2024-08-15 PROCEDURE — 3074F SYST BP LT 130 MM HG: CPT | Performed by: STUDENT IN AN ORGANIZED HEALTH CARE EDUCATION/TRAINING PROGRAM

## 2024-08-15 PROCEDURE — 1123F ACP DISCUSS/DSCN MKR DOCD: CPT | Performed by: STUDENT IN AN ORGANIZED HEALTH CARE EDUCATION/TRAINING PROGRAM

## 2024-08-15 PROCEDURE — 3078F DIAST BP <80 MM HG: CPT | Performed by: STUDENT IN AN ORGANIZED HEALTH CARE EDUCATION/TRAINING PROGRAM

## 2024-08-15 NOTE — PROGRESS NOTES
"Subjective   Patient ID: Brigitte Lovell is a 73 y.o. female who presents for Thyroid Problem (Multinodular thyroid / no labs/ US of thyroid 8/1/24/ no thyroid medication).  HPI  The patient is a 73-year-old female with history of multinodular goiter status post right hemithyroidectomy in 2014 in benign pathology (hyperplasia ) , presents for  thyroid nodules follow up.    History :  Previously followed with Dr. Saravia   ultrasound in his office documented in 2021 , with 1.5 cm nodule and an additional 8 mm nodule in the left side. No FNA of left thyroid nodule done  Baseline US in 2023 , has 3 left nodules 1.5 cm , 1,3 cm and 1 cm  She feels well denies compressive symptoms  She does have hoarseness of voice towards the end of the day or after talking for long.      Thyroid US 8/2024     Nodule #1 in the midpole:  Size: 1.3 x 0.9 x 1.0 cm  Composition: spongiform (0)  Echogenicity: Hyperechoic or isoechoic (1)  Shape: Wider-than-tall (0)  Margin: Smooth (0)  Echogenic Foci: None or Large comet-tail artifacs (0)        Nodule #2 in the inferior pole:  Size: 1.0 x 0.9 x 0.7 cm  Composition: Solid or almost completely solid (2)  Echogenicity: Hyperechoic or isoechoic (1)  Shape: Wider-than-tall (0)  Margin: Smooth (0)  Echogenic Foci: None or Large comet-tail artifacs (0)      Total score of this nodule is 3 corresponding to a TI-RADS category  3; Mildly suspicious. FNA is recommended if >2.5cm, Follow up if  >1.5cm in 1, 3, and 5 years..      Nodule #3 in the inferior pole:  Size: 1.6 x 1.1 x 1.4 cm  Composition: mixed cystic and solid (1)  Echogenicity: Hypoechoic (2)  Shape: Wider-than-tall (0)  Margin: Smooth (0)  Echogenic Foci: None or Large comet-tail artifacs (0)        Review of Systems        Objective   Physical Exam Visit Vitals  /62   Ht 1.651 m (5' 5\")   Wt 82.1 kg (181 lb)   BMI 30.12 kg/m²   OB Status Postmenopausal   Smoking Status Never   BSA 1.94 m²        Assessment/Plan        The patient " is a 72-year-old female with history of multinodular goiter status post right hemithyroidectomy in 2014 in benign pathology (hyperplasia ) .She has been monitored for left thyroid nodules. She has 3 dominant nodules on left side , largest measuring 1.6 cm , 1.3 cm and 1 cm respectively. FNA of left 1.6 cm nodule on 10/2023 Cytology showed benign follicular nodule    - yearly US stable   - clinically and biochemically euthyoid   - hoarseness of voice stable , usually towards the end of the day . Declines speech therapy        RTC in 1 yr with repeat US

## 2024-08-30 ENCOUNTER — OFFICE VISIT (OUTPATIENT)
Dept: CARDIOLOGY | Facility: CLINIC | Age: 74
End: 2024-08-30
Payer: MEDICARE

## 2024-08-30 VITALS
DIASTOLIC BLOOD PRESSURE: 84 MMHG | WEIGHT: 183 LBS | BODY MASS INDEX: 30.49 KG/M2 | OXYGEN SATURATION: 96 % | SYSTOLIC BLOOD PRESSURE: 136 MMHG | HEART RATE: 73 BPM | HEIGHT: 65 IN

## 2024-08-30 DIAGNOSIS — R07.89 CHEST TIGHTNESS: Primary | ICD-10-CM

## 2024-08-30 DIAGNOSIS — I35.1 NONRHEUMATIC AORTIC VALVE INSUFFICIENCY: ICD-10-CM

## 2024-08-30 DIAGNOSIS — I35.0 NONRHEUMATIC AORTIC VALVE STENOSIS: ICD-10-CM

## 2024-08-30 PROCEDURE — 1125F AMNT PAIN NOTED PAIN PRSNT: CPT | Performed by: INTERNAL MEDICINE

## 2024-08-30 PROCEDURE — 3079F DIAST BP 80-89 MM HG: CPT | Performed by: INTERNAL MEDICINE

## 2024-08-30 PROCEDURE — 1036F TOBACCO NON-USER: CPT | Performed by: INTERNAL MEDICINE

## 2024-08-30 PROCEDURE — 99214 OFFICE O/P EST MOD 30 MIN: CPT | Performed by: INTERNAL MEDICINE

## 2024-08-30 PROCEDURE — 3075F SYST BP GE 130 - 139MM HG: CPT | Performed by: INTERNAL MEDICINE

## 2024-08-30 PROCEDURE — 1159F MED LIST DOCD IN RCRD: CPT | Performed by: INTERNAL MEDICINE

## 2024-08-30 PROCEDURE — 1157F ADVNC CARE PLAN IN RCRD: CPT | Performed by: INTERNAL MEDICINE

## 2024-08-30 PROCEDURE — 93005 ELECTROCARDIOGRAM TRACING: CPT | Performed by: INTERNAL MEDICINE

## 2024-08-30 PROCEDURE — 1160F RVW MEDS BY RX/DR IN RCRD: CPT | Performed by: INTERNAL MEDICINE

## 2024-08-30 PROCEDURE — 1123F ACP DISCUSS/DSCN MKR DOCD: CPT | Performed by: INTERNAL MEDICINE

## 2024-08-30 PROCEDURE — 3008F BODY MASS INDEX DOCD: CPT | Performed by: INTERNAL MEDICINE

## 2024-08-30 PROCEDURE — 99204 OFFICE O/P NEW MOD 45 MIN: CPT | Performed by: INTERNAL MEDICINE

## 2024-08-30 ASSESSMENT — PAIN SCALES - GENERAL: PAINLEVEL: 5

## 2024-08-30 NOTE — PROGRESS NOTES
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  The degree of aortic valve insufficiency may be mild to moderate.  Regardless neither is at the point of any intervention.  No need for an echocardiogram in 1 year but probably would repeat 1 in .  Hypertension: Blood pressure is reasonably well-controlled.  Recommend no changes.  Chest tightness: This is somewhat atypical and that it occurs almost exclusively at rest.  She does not have exertional symptoms.  She does have a family history of coronary disease with her father having bypass surgery almost at the same age that she is.  For that reason I think a stress echocardiogram is warranted.  Even though she has mild aortic valve stenosis it would be safe to go ahead with stress echocardiography.  Carotid bruits: Patient has no signs or symptoms of TIA or CVA.  I can reliably hear the aortic valve stenosis murmur tracking up into both carotids.  I do not think she has carotid stenosis.  If she has anything from a symptom standpoint that would suggest TIA or CVA certainly a carotid ultrasound could be done at that point.  Disp: Phone follow-up with stress echo results ,RTO in 1 year or sooner if needed      HPI:  Patient is seen today for evaluation of abnormal echocardiogram.  Her echocardiogram shows normal LV systolic function with a mix of mild aortic valve stenosis and mild-moderate aortic valve insufficiency.  She believes the echocardiogram was ordered due to some chest tightness.  Indeed she has been having some intermittent chest tightness that almost always occurs at rest.  It typically last for a few minutes and then spontaneously resolves.  She has had no syncopal events.  No dyspnea with exertion.  She will occasionally feel some palpitations at  night but no episodes of tachycardia.      Problem List:   Patient Active Problem List   Diagnosis    Abnormal otoacoustic emissions test    Anxiety    Benign essential hypertension    Bilateral hearing loss    Colon polyp    Cough    Snoring    Diplopia    Dry eye syndrome    Dyslipidemia, goal LDL below 130    Fatigue    GERD (gastroesophageal reflux disease)    Impacted cerumen of both ears    Impacted cerumen of left ear    Impacted cerumen of right ear    Impaired fasting glucose    Insect bite    Knee pain, left    Knee strain    Mass of soft tissue of right upper extremity    Nail fungus    Nuclear sclerosis    Obstructive sleep apnea    Other instability, left knee    Sciatica of left side    Sensation of plugged ear on both sides    Multinodular thyroid    Solitary thyroid nodule    Sore throat    Suspected COVID-19 virus infection    Synovial cyst of left knee    Primary osteoarthritis of left knee    Synovial plica syndrome of left knee    Toe pain, chronic    Tubular adenoma of colon    Valgus deformity of great toe    Vitamin B12 deficiency    Chronic left-sided low back pain without sciatica    Pain of left hip    Overweight (BMI 25.0-29.9)    Bilateral high frequency sensorineural hearing loss    Hearing difficulty of both ears    Hoarseness of voice    Nasal discomfort    Nasal folliculitis    Healthcare maintenance    Lumbar degenerative disc disease    Medicare annual wellness visit, subsequent    Annual physical exam    Other screening mammogram    History of elevated lipids    History of glaucoma    History of hypertension    Sensation of plugged ear    Stage 3 chronic kidney disease (Multi)    Exertional dyspnea        Meds:   Current Outpatient Medications on File Prior to Visit   Medication Sig Dispense Refill    aspirin (Yanna Low Dose Aspirin) 81 mg EC tablet Take 1 tablet (81 mg) by mouth once daily.      atorvastatin (Lipitor) 20 mg tablet TAKE ONE TABLET BY MOUTH EVERY DAY 90 tablet 3     "lisinopril 20 mg tablet TAKE ONE TABLET BY MOUTH EVERY DAY 90 tablet 2    meloxicam (Mobic) 7.5 mg tablet TAKE ONE TABLET BY MOUTH DAILY WITH FOOD AS NEEDED 90 tablet 3    multivit-min/iron/folic/lutein (CENTRUM SILVER WOMEN ORAL) Take 1 tablet by mouth once daily.      tretinoin (Retin-A) 0.025 % cream APPLY A THIN LAYER TO FACE EVERY NIGHT AT BEDTIME. START WITH ONCE A WEEK AND INCREASE AS TOLERATED      triamcinolone (Kenalog) 0.025 % cream Apply 1 Application topically. apply to rash two times a day for up to 10 days repeat as needed during flares       No current facility-administered medications on file prior to visit.       All: No Known Allergies    Fam Hx:   Family History   Problem Relation Name Age of Onset    Other (triple bypass) Father         Soc Hx:   Social History     Socioeconomic History    Marital status:      Spouse name: Not on file    Number of children: 3    Years of education: Not on file    Highest education level: Not on file   Occupational History    Not on file   Tobacco Use    Smoking status: Never    Smokeless tobacco: Never   Vaping Use    Vaping status: Never Used   Substance and Sexual Activity    Alcohol use: Not Currently     Comment: Once in a blue moon, social    Drug use: Never    Sexual activity: Not on file   Other Topics Concern    Not on file   Social History Narrative    Not on file     Social Determinants of Health     Financial Resource Strain: Not on file   Food Insecurity: Not on file   Transportation Needs: Not on file   Physical Activity: Not on file   Stress: Not on file   Social Connections: Not on file   Intimate Partner Violence: Not on file   Housing Stability: Not on file       VS: /84 (BP Location: Right arm, Patient Position: Sitting, BP Cuff Size: Adult)   Pulse 73   Ht 1.651 m (5' 5\")   Wt 83 kg (183 lb)   SpO2 96%   BMI 30.45 kg/m²      Physical Exam  Vitals reviewed.   Constitutional:       Appearance: Normal appearance.   Eyes:      " Pupils: Pupils are equal, round, and reactive to light.   Neck:      Vascular: No JVD.   Cardiovascular:      Rate and Rhythm: Normal rate and regular rhythm.      Pulses: Normal pulses.      Heart sounds: Murmur heard.      Systolic murmur is present with a grade of 1/6.      No gallop.   Pulmonary:      Effort: No respiratory distress.      Breath sounds: No wheezing or rales.   Abdominal:      General: Abdomen is flat. There is no distension.      Palpations: Abdomen is soft.   Musculoskeletal:         General: No swelling.      Right lower leg: No edema.      Left lower leg: No edema.   Neurological:      General: No focal deficit present.      Mental Status: She is alert.   Psychiatric:         Mood and Affect: Mood normal.            Encounter Date: 03/06/24   ECG 12 lead (Clinic Performed)    Narrative    Sinus rhythm.  Normal ECG.  Heart rate 65 bpm.        ECHO: Results for orders placed during the hospital encounter of 07/25/24    Transthoracic Echo (TTE) Complete    Narrative  [facility] ([facility] [address] [place] [address]  Tel [phone] and Fax [phone]    TRANSTHORACIC ECHOCARDIOGRAM REPORT      Patient Name:      KANDICE CARTERGOPI WILSON     Reading Physician:    [id number] Rogelio Key MD  Study Date:        7/25/2024            Ordering Provider:    [id number] CARLITOS BRUCE  MRN/PID:           [id number]             Fellow:  Accession#:        [id number]         Nurse:  Date of Birth/Age: 1950 / 73      Sonographer:          Ruth Santiago RCS  years  Gender:            F                    Additional Staff:  Height:            165.10 cm            Admit Date:  Weight:            82.55 kg             Admission Status:     Outpatient  BSA / BMI:         1.90 m2 / 30.29      Encounter#:           [id number]  kg/m2  Blood Pressure:    120/77 mmHg          Department Location:  [facility]    Study Type:    TRANSTHORACIC ECHO (TTE) COMPLETE  Diagnosis/ICD: Other forms of  dyspnea-R06.09  Indication:    Extertional Dyspnea  CPT Code:      Echo Complete w Full [id number]    Patient History:  Pertinent History: HTN. LOREDO, Dyslipidemia, CKD III, JESIKA.    Study Detail: The following Echo studies were performed: 2D, M-Mode, Doppler and  color flow. Technically challenging study due to prominent lung  artifact.      PHYSICIAN INTERPRETATION:  Left Ventricle: Left ventricular ejection fraction is normal, calculated by Barth's biplane at 65%. Estimated left ventricular mass is normal. There are no regional wall motion abnormalities. The left ventricular cavity size is normal. Left ventricular diastolic filling was indeterminate.  Left Atrium: The left atrium is normal in size.  Right Ventricle: The right ventricle is normal in size. There is normal right ventricular global systolic function.  Right Atrium: The right atrium is normal in size.  Aortic Valve: The aortic valve is trileaflet. There is mild aortic valve cusp calcification. There is mild to moderate aortic valve thickening. There is evidence of mild aortic valve stenosis. The aortic valve dimensionless index is 0.43. The peak aortic velocity was obtained from the apical view. There is mild to moderate aortic valve regurgitation. The peak instantaneous gradient of the aortic valve is 25.4 mmHg. The mean gradient of the aortic valve is 15.3 mmHg.  Mitral Valve: The mitral valve is mildly thickened. There is trace to mild mitral valve regurgitation.  Tricuspid Valve: The tricuspid valve is structurally normal. There is trace tricuspid regurgitation. The Doppler estimated RVSP is slightly elevated at 33.0 mmHg.  Pulmonic Valve: The pulmonic valve is structurally normal. There is mild pulmonic valve regurgitation.  Pericardium: There is no pericardial effusion noted.  Aorta: The aortic root is normal. The Ao Sinus is 3.20 cm. The Asc Ao is 2.90 cm. The thoracic aorta diam is 2.9 cm.  Systemic Veins: The inferior vena cava appears to  be of normal size. There is IVC inspiratory collapse greater than 50%.  In comparison to the previous echocardiogram(s): There are no prior studies on this patient for comparison purposes.      CONCLUSIONS:  1. Left ventricular ejection fraction is normal, calculated by Barth's biplane at 65%.  2. Left ventricular diastolic filling was indeterminate.  3. There is normal right ventricular global systolic function.  4. Slightly elevated RVSP.  5. Mild aortic valve stenosis.  6. Mild to moderate aortic valve regurgitation.    QUANTITATIVE DATA SUMMARY:  2D MEASUREMENTS:  Normal Ranges:  LAs:           3.97 cm   (2.7-4.0cm)  RVIDd:         3.07 cm   (0.9-3.6cm)  IVSd:          1.07 cm   (0.6-1.1cm)  LVPWd:         1.03 cm   (0.6-1.1cm)  LVIDd:         3.91 cm   (3.9-5.9cm)  LVIDs:         2.39 cm  LV Mass Index: 69.0 g/m2  LV % FS        38.8 %    LA VOLUME:  Normal Ranges:  LA Vol A4C:        62.1 ml    (22+/-6mL/m2)  LA Vol A2C:        55.7 ml  LA Vol BP:         61.1 ml  LA Vol Index A4C:  32.7 ml/m2  LA Vol Index A2C:  29.3 ml/m2  LA Vol Index BP:   32.2 ml/m2  LA Area A4C:       19.3 cm2  LA Area A2C:       19.0 cm2  LA Major Axis A4C: 5.1 cm  LA Major Axis A2C: 5.5 cm  LA Volume Index:   32.2 ml/m2  LA Vol A4C:        58.8 ml  LA Vol A2C:        51.0 ml    RA VOLUME BY A/L METHOD:  Normal Ranges:  RA Vol A4C:        38.8 ml    (8.3-19.5ml)  RA Vol Index A4C:  20.4 ml/m2  RA Area A4C:       15.1 cm2  RA Major Axis A4C: 5.0 cm    AORTA MEASUREMENTS:  Normal Ranges:  Ao Sinus, d: 3.20 cm (2.1-3.5cm)  Asc Ao, d:   2.90 cm (2.1-3.4cm)  Ao Arch:     2.90 cm (2.0-3.6cm)    LV SYSTOLIC FUNCTION BY 2D PLANIMETRY (MOD):  Normal Ranges:  EF-A4C View:    62 % (>=55%)  EF-A2C View:    69 %  EF-Biplane:     65 %  LV EF Reported: 65 %    LV DIASTOLIC FUNCTION:  Normal Ranges:  MV Peak E:        0.82 m/s    (0.7-1.2 m/s)  MV Peak A:        0.97 m/s    (0.42-0.7 m/s)  E/A Ratio:        0.85        (1.0-2.2)  MV e'              0.055 m/s   (>8.0)  MV lateral e'     0.06 m/s  MV medial e'      0.05 m/s  MV A Dur:         113.03 msec  E/e' Ratio:       15.00       (<8.0)  PulmV Sys Arian:    61.54 cm/s  PulmV Shine Arian:   35.39 cm/s  PulmV S/D Arian:    1.74  PulmV A Revs Arian: 19.63 cm/s  PulmV A Revs Dur: 94.58 msec    MITRAL VALVE:  Normal Ranges:  MV DT: 219 msec (150-240msec)    AORTIC VALVE:  Normal Ranges:  AoV Vmax:                2.52 m/s  (<=1.7m/s)  AoV Peak P.4 mmHg (<20mmHg)  AoV Mean PG:             15.3 mmHg (1.7-11.5mmHg)  LVOT Max Arian:            1.15 m/s  (<=1.1m/s)  AoV VTI:                 54.69 cm  (18-25cm)  LVOT VTI:                23.29 cm  LVOT Diameter:           2.03 cm   (1.8-2.4cm)  AoV Area, VTI:           1.38 cm2  (2.5-5.5cm2)  AoV Area,Vmax:           1.48 cm2  (2.5-4.5cm2)  AoV Area, planim:        1.11 cm2  (2.5-4.5cm2)  AoV Dimensionless Index: 0.43    AORTIC INSUFFICIENCY:  AI Vmax:       4.46 m/s  AI Half-time:  509 msec  AI Decel Time: 1756 msec  AI Decel Rate: 255.96 cm/s2      RIGHT VENTRICLE:  RV Basal 3.80 cm  RV Mid   3.10 cm  RV Major 5.8 cm  TAPSE:   22.5 mm  RV s'    0.14 m/s    TRICUSPID VALVE/RVSP:  Normal Ranges:  Peak TR Velocity: 2.74 m/s  Est. RA Pressure: 3 mmHg  RV Syst Pressure: 33.0 mmHg (< 30mmHg)  IVC Diam:         1.30 cm    PULMONIC VALVE:  Normal Ranges:  PV Accel Time: 161 msec (>120ms)  PV Max Arian:    0.9 m/s  (0.6-0.9m/s)  PV Max PG:     3.6 mmHg  NC Vmax:       0.86 m/s  PIEDV:         0.86 m/s  PADP:          6.0 mmHg    Pulmonary Veins:  PulmV A Revs Dur: 94.58 msec  PulmV A Revs Arian: 19.63 cm/s  PulmV Shine Arian:   35.39 cm/s  PulmV S/D Arian:    1.74  PulmV Sys Arian:    61.54 cm/s    AORTA:  Asc Ao Diam 2.94 cm      [id number] Rogelio Key MD  Electronically signed on 2024 at 11:51:44 AM      Ant Welch MD

## 2024-09-04 LAB
ATRIAL RATE: 67 BPM
P AXIS: 13 DEGREES
P OFFSET: 205 MS
P ONSET: 159 MS
PR INTERVAL: 116 MS
Q ONSET: 217 MS
QRS COUNT: 11 BEATS
QRS DURATION: 92 MS
QT INTERVAL: 432 MS
QTC CALCULATION(BAZETT): 456 MS
QTC FREDERICIA: 448 MS
R AXIS: 35 DEGREES
T AXIS: 48 DEGREES
T OFFSET: 433 MS
VENTRICULAR RATE: 67 BPM

## 2024-09-11 DIAGNOSIS — I10 BENIGN ESSENTIAL HYPERTENSION: ICD-10-CM

## 2024-09-11 RX ORDER — LISINOPRIL 20 MG/1
20 TABLET ORAL DAILY
Qty: 90 TABLET | Refills: 0 | Status: SHIPPED | OUTPATIENT
Start: 2024-09-11

## 2024-10-08 ENCOUNTER — APPOINTMENT (OUTPATIENT)
Dept: OTOLARYNGOLOGY | Facility: CLINIC | Age: 74
End: 2024-10-08
Payer: MEDICARE

## 2024-10-16 ENCOUNTER — HOSPITAL ENCOUNTER (OUTPATIENT)
Dept: CARDIOLOGY | Facility: CLINIC | Age: 74
Discharge: HOME | End: 2024-10-16
Payer: MEDICARE

## 2024-10-16 DIAGNOSIS — R07.89 CHEST TIGHTNESS: ICD-10-CM

## 2024-10-16 DIAGNOSIS — R94.39 ABNORMAL RESULT OF OTHER CARDIOVASCULAR FUNCTION STUDY: ICD-10-CM

## 2024-10-16 PROCEDURE — 93350 STRESS TTE ONLY: CPT | Performed by: INTERNAL MEDICINE

## 2024-10-16 PROCEDURE — 93018 CV STRESS TEST I&R ONLY: CPT | Performed by: INTERNAL MEDICINE

## 2024-10-16 PROCEDURE — 93016 CV STRESS TEST SUPVJ ONLY: CPT | Performed by: INTERNAL MEDICINE

## 2024-10-16 PROCEDURE — 93017 CV STRESS TEST TRACING ONLY: CPT

## 2024-10-16 PROCEDURE — 2500000004 HC RX 250 GENERAL PHARMACY W/ HCPCS (ALT 636 FOR OP/ED): Performed by: INTERNAL MEDICINE

## 2024-10-17 ENCOUNTER — TELEPHONE (OUTPATIENT)
Dept: CARDIOLOGY | Facility: HOSPITAL | Age: 74
End: 2024-10-17

## 2024-10-21 ENCOUNTER — APPOINTMENT (OUTPATIENT)
Dept: PRIMARY CARE | Facility: CLINIC | Age: 74
End: 2024-10-21
Payer: MEDICARE

## 2024-10-21 VITALS
OXYGEN SATURATION: 93 % | WEIGHT: 181.8 LBS | TEMPERATURE: 97.7 F | HEART RATE: 63 BPM | DIASTOLIC BLOOD PRESSURE: 78 MMHG | SYSTOLIC BLOOD PRESSURE: 130 MMHG | BODY MASS INDEX: 30.25 KG/M2

## 2024-10-21 DIAGNOSIS — I10 BENIGN ESSENTIAL HYPERTENSION: Primary | ICD-10-CM

## 2024-10-21 DIAGNOSIS — E04.1 SOLITARY THYROID NODULE: ICD-10-CM

## 2024-10-21 DIAGNOSIS — Z12.31 VISIT FOR SCREENING MAMMOGRAM: ICD-10-CM

## 2024-10-21 DIAGNOSIS — R73.01 IMPAIRED FASTING GLUCOSE: ICD-10-CM

## 2024-10-21 DIAGNOSIS — E78.5 DYSLIPIDEMIA, GOAL LDL BELOW 130: ICD-10-CM

## 2024-10-21 DIAGNOSIS — D12.6 TUBULAR ADENOMA OF COLON: ICD-10-CM

## 2024-10-21 DIAGNOSIS — G47.33 OBSTRUCTIVE SLEEP APNEA: ICD-10-CM

## 2024-10-21 DIAGNOSIS — M51.369 DEGENERATION OF INTERVERTEBRAL DISC OF LUMBAR REGION, UNSPECIFIED WHETHER PAIN PRESENT: ICD-10-CM

## 2024-10-21 DIAGNOSIS — G47.33 OSA (OBSTRUCTIVE SLEEP APNEA): ICD-10-CM

## 2024-10-21 DIAGNOSIS — E53.8 VITAMIN B12 DEFICIENCY: ICD-10-CM

## 2024-10-21 DIAGNOSIS — N18.31 STAGE 3A CHRONIC KIDNEY DISEASE (MULTI): ICD-10-CM

## 2024-10-21 PROCEDURE — 99214 OFFICE O/P EST MOD 30 MIN: CPT | Performed by: INTERNAL MEDICINE

## 2024-10-21 PROCEDURE — G2211 COMPLEX E/M VISIT ADD ON: HCPCS | Performed by: INTERNAL MEDICINE

## 2024-10-21 PROCEDURE — 3075F SYST BP GE 130 - 139MM HG: CPT | Performed by: INTERNAL MEDICINE

## 2024-10-21 PROCEDURE — 1159F MED LIST DOCD IN RCRD: CPT | Performed by: INTERNAL MEDICINE

## 2024-10-21 PROCEDURE — 1036F TOBACCO NON-USER: CPT | Performed by: INTERNAL MEDICINE

## 2024-10-21 PROCEDURE — 1157F ADVNC CARE PLAN IN RCRD: CPT | Performed by: INTERNAL MEDICINE

## 2024-10-21 PROCEDURE — 1160F RVW MEDS BY RX/DR IN RCRD: CPT | Performed by: INTERNAL MEDICINE

## 2024-10-21 PROCEDURE — 3078F DIAST BP <80 MM HG: CPT | Performed by: INTERNAL MEDICINE

## 2024-10-21 PROCEDURE — 1123F ACP DISCUSS/DSCN MKR DOCD: CPT | Performed by: INTERNAL MEDICINE

## 2024-10-21 ASSESSMENT — ENCOUNTER SYMPTOMS
CARDIOVASCULAR NEGATIVE: 1
CONSTITUTIONAL NEGATIVE: 1
RESPIRATORY NEGATIVE: 1
HEMATOLOGIC/LYMPHATIC NEGATIVE: 1
GASTROINTESTINAL NEGATIVE: 1
PSYCHIATRIC NEGATIVE: 1
EYES NEGATIVE: 1
NEUROLOGICAL NEGATIVE: 1
ROS GI COMMENTS: AS HPI

## 2024-10-21 NOTE — PROGRESS NOTES
Subjective   Patient ID: Brigitte Lovell is a 73 y.o. female who presents for Follow-up (Patient is here for a 4 month follow up. ).    Patient is here to f/u on HTN,HLD ,IFG,sleep apnea,OA, (last sleep study in 2016 ),GERD,h/o colon adenoma,pt sees,derm ,she see Dr Carlos Aguilar for her thyroid nodule and had Bx  at [facility],Bx report pending.  She was seen by ophthalmologist and told her she was a glaucoma suspect and that we need to address her sleep apnea, this condition have effect on her iron pressure, in past she did not tolerate CPAP machine mask and had to return it.  has been exercising regularly.no CP, since last visit she saw Dr. Welch, cardiologist and had negative stress test, plan to recheck her echocardiogram in 2026 due to her mild aortic stenosis and mild to moderate aortic regurgitation  she takes her meds regularly, no side effects.  She c/o her usual pain left low back/left posterior hip x 2 months,on Mobic  with partial improvement,sometimes she takes Advil dual action ,no leg pain or numbness,no injury. Her XRAY showed Spondylolythesis,grade 1,she has been doing some kind of therapy and feeling better.  Her constipation under good control ,she has been managing with Dulcolax and high fiber diet,she did not like Miralax)caused pasty stools).  Last colonoscopy 3/18/2021,next in 5 years which will be 3/18/2026, due to polyps.            Review of Systems   Constitutional: Negative.    HENT: Negative.     Eyes: Negative.         As HPI   Respiratory: Negative.     Cardiovascular: Negative.    Gastrointestinal: Negative.         As HPI   Genitourinary: Negative.    Musculoskeletal:         As HPI   Neurological: Negative.    Hematological: Negative.    Psychiatric/Behavioral: Negative.         Objective   /78 (BP Location: Right arm, Patient Position: Sitting, BP Cuff Size: Adult)   Pulse 63   Temp 36.5 °C (97.7 °F) (Temporal)   Wt 82.5 kg (181 lb 12.8 oz)   SpO2 93%   BMI 30.25 kg/m²      Physical Exam  Constitutional:       Appearance: Normal appearance.   HENT:      Head: Normocephalic and atraumatic.   Eyes:      Extraocular Movements: Extraocular movements intact.      Pupils: Pupils are equal, round, and reactive to light.   Neck:      Vascular: No carotid bruit.   Cardiovascular:      Rate and Rhythm: Normal rate and regular rhythm.      Heart sounds: Murmur heard.      Comments: 1/6 systolic murmur.  Pulmonary:      Effort: Pulmonary effort is normal.      Breath sounds: Normal breath sounds. No wheezing or rhonchi.   Abdominal:      General: Abdomen is flat.   Musculoskeletal:         General: Normal range of motion.      Cervical back: Normal range of motion and neck supple.      Right lower leg: No edema.      Left lower leg: No edema.   Skin:     General: Skin is warm.   Neurological:      General: No focal deficit present.      Mental Status: She is alert and oriented to person, place, and time.   Psychiatric:         Mood and Affect: Mood normal.         Behavior: Behavior normal.         Assessment/Plan   Problem List Items Addressed This Visit             ICD-10-CM    Benign essential hypertension - Primary I10     Stable on current medication.  Return for MCR in March.         Relevant Orders    CBC    Comprehensive Metabolic Panel    Dyslipidemia, goal LDL below 130 E78.5    Relevant Orders    Lipid Panel    Impaired fasting glucose R73.01     Order labs  Follow 1800 nicole ADA diet.         Relevant Orders    Hemoglobin A1C    Albumin-Creatinine Ratio, Urine Random    Obstructive sleep apnea G47.33     Will refer to sleep study medicine.         Solitary thyroid nodule E04.1     Monitored by endocrinologist.  Check TSH yearly.         Relevant Orders    TSH with reflex to Free T4 if abnormal    Tubular adenoma of colon D12.6     Colonoscopy up-to-date next colonoscopy in 2026.         Vitamin B12 deficiency E53.8     Check blood level.         Relevant Orders    Vitamin B12     Lumbar degenerative disc disease M51.369     Continue stretching exercise.         Stage 3 chronic kidney disease (Multi) N18.30     Monitor labs order urine for albumin ,avoid NSAID intake.         Relevant Orders    Albumin-Creatinine Ratio, Urine Random    JESIKA (obstructive sleep apnea) G47.33    Relevant Orders    Referral to Adult Sleep Medicine    Visit for screening mammogram Z12.31    Relevant Orders    BI mammo bilateral screening tomosynthesis

## 2024-10-21 NOTE — ASSESSMENT & PLAN NOTE
Check blood level.  
Colonoscopy up-to-date next colonoscopy in 2026.  
Continue stretching exercise.  
Monitor labs order urine for albumin ,avoid NSAID intake.  
Monitored by endocrinologist.  Check TSH yearly.  
Order labs  Follow 1800 nicole ADA diet.  
Stable on current medication.  Return for MCR in March.  
Will refer to sleep study medicine.  
03-Feb-2016

## 2024-11-07 ENCOUNTER — APPOINTMENT (OUTPATIENT)
Dept: OTOLARYNGOLOGY | Facility: CLINIC | Age: 74
End: 2024-11-07
Payer: MEDICARE

## 2024-12-03 ENCOUNTER — APPOINTMENT (OUTPATIENT)
Dept: OTOLARYNGOLOGY | Facility: CLINIC | Age: 74
End: 2024-12-03
Payer: MEDICARE

## 2024-12-10 DIAGNOSIS — I10 BENIGN ESSENTIAL HYPERTENSION: ICD-10-CM

## 2024-12-10 RX ORDER — LISINOPRIL 20 MG/1
20 TABLET ORAL DAILY
Qty: 90 TABLET | Refills: 3 | Status: SHIPPED | OUTPATIENT
Start: 2024-12-10

## 2025-01-28 ENCOUNTER — APPOINTMENT (OUTPATIENT)
Dept: SLEEP MEDICINE | Facility: CLINIC | Age: 75
End: 2025-01-28
Payer: COMMERCIAL

## 2025-01-30 LAB
ALBUMIN SERPL-MCNC: 4.5 G/DL (ref 3.6–5.1)
ALBUMIN/CREAT UR: 3 MG/G CREAT
ALP SERPL-CCNC: 107 U/L (ref 37–153)
ALT SERPL-CCNC: 20 U/L (ref 6–29)
ANION GAP SERPL CALCULATED.4IONS-SCNC: 13 MMOL/L (CALC) (ref 7–17)
AST SERPL-CCNC: 15 U/L (ref 10–35)
BILIRUB SERPL-MCNC: 0.5 MG/DL (ref 0.2–1.2)
BUN SERPL-MCNC: 19 MG/DL (ref 7–25)
CALCIUM SERPL-MCNC: 9.5 MG/DL (ref 8.6–10.4)
CHLORIDE SERPL-SCNC: 105 MMOL/L (ref 98–110)
CHOLEST SERPL-MCNC: 191 MG/DL
CHOLEST/HDLC SERPL: 2.4 (CALC)
CO2 SERPL-SCNC: 25 MMOL/L (ref 20–32)
CREAT SERPL-MCNC: 0.89 MG/DL (ref 0.6–1)
CREAT UR-MCNC: 118 MG/DL (ref 20–275)
EGFRCR SERPLBLD CKD-EPI 2021: 68 ML/MIN/1.73M2
ERYTHROCYTE [DISTWIDTH] IN BLOOD BY AUTOMATED COUNT: 11.7 % (ref 11–15)
EST. AVERAGE GLUCOSE BLD GHB EST-MCNC: 128 MG/DL
EST. AVERAGE GLUCOSE BLD GHB EST-SCNC: 7.1 MMOL/L
GLUCOSE SERPL-MCNC: 101 MG/DL (ref 65–99)
HBA1C MFR BLD: 6.1 % OF TOTAL HGB
HCT VFR BLD AUTO: 44.4 % (ref 35–45)
HDLC SERPL-MCNC: 80 MG/DL
HGB BLD-MCNC: 14.6 G/DL (ref 11.7–15.5)
LDLC SERPL CALC-MCNC: 90 MG/DL (CALC)
MCH RBC QN AUTO: 31.1 PG (ref 27–33)
MCHC RBC AUTO-ENTMCNC: 32.9 G/DL (ref 32–36)
MCV RBC AUTO: 94.5 FL (ref 80–100)
MICROALBUMIN UR-MCNC: 0.3 MG/DL
NONHDLC SERPL-MCNC: 111 MG/DL (CALC)
PLATELET # BLD AUTO: 299 THOUSAND/UL (ref 140–400)
PMV BLD REES-ECKER: 9.8 FL (ref 7.5–12.5)
POTASSIUM SERPL-SCNC: 4.8 MMOL/L (ref 3.5–5.3)
PROT SERPL-MCNC: 6.7 G/DL (ref 6.1–8.1)
RBC # BLD AUTO: 4.7 MILLION/UL (ref 3.8–5.1)
SODIUM SERPL-SCNC: 143 MMOL/L (ref 135–146)
TRIGL SERPL-MCNC: 115 MG/DL
TSH SERPL-ACNC: 1.37 MIU/L (ref 0.4–4.5)
VIT B12 SERPL-MCNC: 347 PG/ML (ref 200–1100)
WBC # BLD AUTO: 4.9 THOUSAND/UL (ref 3.8–10.8)

## 2025-03-05 NOTE — PROGRESS NOTES
Patient: Brigitte Lovell  : 1950 AGE: 74 y.o. SEX:female   MRN: [id number]   Provider: CHERELLE Mchugh-CNP     Location [facility]   Service Date: 3/19/2025     PCP: Penny Jackson MD   Referred by: Penny Jackson MD          [facility]  New Visit Note      HISTORY OF PRESENT ILLNESS     Brigitte Lovell is a 74 y.o. female with a h/o   anxiety, HTN, HLD, GERD, tubular adenoma of colon, glaucoma,cataract removal?   who presents to [facility].    3/18/25: NPV with concerns of JESIKA management. Patient was diagnosed with JESIKA in  by HSAT and was using CPAP intermittently for 3 months then stopped due to increased anxiety and PAP intolerance. She returned CPAP machine and is not interested in retrying therapy. Presents today at the request of her ophthalmologist due to vision troubles/glaucoma and was told this may be related to untreated JESIKA. Overall, she is not bothered by her sleep or daytime functioning levels. Does snore and sometimes wakes un refreshed. ----> HSAT       SLEEP STUDY HISTORY (personally reviewed raw data such as interpretation report, data sheet, hypnogram, and titration table if available and applicable)  - HSAT through [employer] 2016- mild JESIKA with AHI 5.7, SpO2 papito 83%    SLEEP-WAKE SCHEDULE    Sleep Patterns: She does have a usual bed partner-dog. In terms of the patient's sleep/wake cycle, she generally gets into bed at approximately 8 PM.  her latency to sleep onset after lights out is <60 min. During the night, the patient generally awakens 1-2 times nightly. These awakenings are usually brief in duration. Final wake time on weekday mornings is around 4-5 AM. Naps mid afternoon for 2-3 hours which is refreshing.     Compared to weekdays, the patient's sleep schedule is  similar on the weekends.    Breathing during sleep: snoring and night sweats  Behaviors at night: No    Sleep paralysis: No   Hypnogogic or hypnopompic hallucinations: No   Cataplexy: No     RLS screen: Patient denies RLS symptoms.    Daytime Symptoms:  On awakening patient reports: waking refreshed and sometimes wakes unrefreshed    Sleep environment:  Preferred sleep position: back, stomach, and side  Room is dark: Yes  Room is quiet: Yes  Room is cool: Yes  Bed comfort: good    SLEEP HABITS  Caffeine consumption: Yes, Patient consumes caffeine beverage regularly, about 2 cup(s)/day.  Alcohol consumption: No  Smoking: No  Marijuana: No  Sleep aids: denies     WEIGHT: stable    ESS: 7  ALYCIA: 6    REVIEW OF SYSTEMS     All other systems have been reviewed and are negative.    ALLERGIES     No Known Allergies    MEDICATIONS     Current Outpatient Medications   Medication Sig Dispense Refill    aspirin (Yanna Low Dose Aspirin) 81 mg EC tablet Take 1 tablet (81 mg) by mouth once daily.      atorvastatin (Lipitor) 20 mg tablet TAKE ONE TABLET BY MOUTH EVERY DAY 90 tablet 3    lisinopril 20 mg tablet TAKE ONE TABLET BY MOUTH EVERY DAY 90 tablet 3    meloxicam (Mobic) 7.5 mg tablet TAKE ONE TABLET BY MOUTH DAILY WITH FOOD AS NEEDED 90 tablet 3    multivit-min/iron/folic/lutein (CENTRUM SILVER WOMEN ORAL) Take 1 tablet by mouth once daily.      tretinoin (Retin-A) 0.025 % cream APPLY A THIN LAYER TO FACE EVERY NIGHT AT BEDTIME. START WITH ONCE A WEEK AND INCREASE AS TOLERATED      triamcinolone (Kenalog) 0.025 % cream Apply 1 Application topically. apply to rash two times a day for up to 10 days repeat as needed during flares       No current facility-administered medications for this visit.       PAST HISTORIES     PERTINENT PAST MEDICAL HISTORY: See HPI    PERTINENT PAST SURGICAL HISTORY for Sleep Medicine:  non-contributory    PERTINENT FAMILY HISTORY for Sleep Medicine:  Patient denies family history of any sleep disorder.    PERTINENT SOCIAL HISTORY:  She  reports that she has never smoked. She has never used smokeless  "tobacco. She reports that she does not currently use alcohol. She reports that she does not use drugs. She currently lives with spouse.    Active Problems, Allergy List, Medication List, and PMH/PSH/FH/Social Hx have been reviewed and reconciled in chart. No significant changes unless documented in the pertinent chart section. Updates made when necessary.     PHYSICAL EXAM     VITAL SIGNS: /85   Pulse 83   Resp 18   Ht 1.651 m (5' 5\")   Wt 82.6 kg (182 lb)   SpO2 96%   BMI 30.29 kg/m²     CURRENT WEIGHT:   Vitals:    03/18/25 1550   Weight: 82.6 kg (182 lb)      PREVIOUS WEIGHTS:  Wt Readings from Last 3 Encounters:   03/18/25 82.6 kg (182 lb)   10/21/24 82.5 kg (181 lb 12.8 oz)   08/30/24 83 kg (183 lb)     Physical Exam  Constitutional: Awake, not in distress  Skin: Warm, no rash  Neuro: No tremors, moves all extremities  Psych: alert and oriented to time, place, and person    HEENT:   Tonsils enlargement grade 1+   Airway comments: narrow lateral walls   Tongue scalloping: slight   Modified Mallampati score - 2    RESULTS/DATA     No results found for: \"IRON\", \"TRANSFERRIN\", \"IRONSAT\", \"TIBC\", \"FERRITIN\"    CARBON DIOXIDE   Date Value Ref Range Status   01/29/2025 25 20 - 32 mmol/L Final       ASSESSMENT/PLAN     Ms. Lovell is a 74 y.o. female and She was referred to the [facility] for evaluation of JESIKA    Problem List, Orders, Assessment, Recommendations:    #JESIKA  - HSAT through [employer] 4/6/2016- mild JESIKA with AHI 5.7, SpO2 papito 83%  - Previously intolerant to PAP for short period of time  - Still mildly symptomatic   - Obtain home sleep apnea testing for JESIKA reevaluation (will call with results and determine tx options)   - Follow up after sleep study to review results and determine plan of care (Will send [employer] message with the results if normal)  -Diet, exercise, and weight loss were emphasized today in clinic, as were non-supine sleep, avoiding " alcohol in the late evening, and driving or operating heavy machinery when sleepy.     #HTN  BP Readings from Last 1 Encounters:   03/18/25 121/85     - doing well, asymptomatic, denies any headache, blurry vision, chest pain, palpitation, dizziness, lightheadedness, or syncopal episodes  - discussed at length the impact of untreated JESIKA and BP control  - supportive management: low salt DASH diet (less than 2000 mg sodium intake daily), moderate intensity aerobic exercise at least 30 minutes 5 days per week, reduce stress, quit smoking, limit alcohol, lose weight, and monitor BP once daily  - continue current management and follow-up with PCP     #Obesity  BMI Readings from Last 1 Encounters:   03/18/25 30.29 kg/m²     - Encouraged healthy weight loss via diet and exercise  - Weight loss can help in the long term treatment of JESIKA.  - Defer management to PCP     All of patient's questions were answered. She verbalizes understanding and agreement with my assessment and plan.    Disposition    Follow up after sleep study if positive

## 2025-03-07 ENCOUNTER — APPOINTMENT (OUTPATIENT)
Dept: PRIMARY CARE | Facility: CLINIC | Age: 75
End: 2025-03-07
Payer: MEDICARE

## 2025-03-09 DIAGNOSIS — E78.5 DYSLIPIDEMIA, GOAL LDL BELOW 130: ICD-10-CM

## 2025-03-10 RX ORDER — ATORVASTATIN CALCIUM 20 MG/1
20 TABLET, FILM COATED ORAL DAILY
Qty: 90 TABLET | Refills: 3 | Status: SHIPPED | OUTPATIENT
Start: 2025-03-10

## 2025-03-18 ENCOUNTER — APPOINTMENT (OUTPATIENT)
Facility: CLINIC | Age: 75
End: 2025-03-18
Payer: COMMERCIAL

## 2025-03-18 VITALS
BODY MASS INDEX: 30.32 KG/M2 | DIASTOLIC BLOOD PRESSURE: 85 MMHG | RESPIRATION RATE: 18 BRPM | HEART RATE: 83 BPM | SYSTOLIC BLOOD PRESSURE: 121 MMHG | WEIGHT: 182 LBS | OXYGEN SATURATION: 96 % | HEIGHT: 65 IN

## 2025-03-18 DIAGNOSIS — G47.33 OSA (OBSTRUCTIVE SLEEP APNEA): Primary | ICD-10-CM

## 2025-03-18 DIAGNOSIS — I10 BENIGN ESSENTIAL HYPERTENSION: ICD-10-CM

## 2025-03-18 DIAGNOSIS — E66.811 OBESITY (BMI 30.0-34.9): ICD-10-CM

## 2025-03-18 PROCEDURE — 1123F ACP DISCUSS/DSCN MKR DOCD: CPT | Performed by: NURSE PRACTITIONER

## 2025-03-18 PROCEDURE — 1160F RVW MEDS BY RX/DR IN RCRD: CPT | Performed by: NURSE PRACTITIONER

## 2025-03-18 PROCEDURE — G2211 COMPLEX E/M VISIT ADD ON: HCPCS | Performed by: NURSE PRACTITIONER

## 2025-03-18 PROCEDURE — 1157F ADVNC CARE PLAN IN RCRD: CPT | Performed by: NURSE PRACTITIONER

## 2025-03-18 PROCEDURE — 3074F SYST BP LT 130 MM HG: CPT | Performed by: NURSE PRACTITIONER

## 2025-03-18 PROCEDURE — G8433 SCR FOR DEP NOT CPT DOC RSN: HCPCS | Performed by: NURSE PRACTITIONER

## 2025-03-18 PROCEDURE — 99204 OFFICE O/P NEW MOD 45 MIN: CPT | Performed by: NURSE PRACTITIONER

## 2025-03-18 PROCEDURE — 1159F MED LIST DOCD IN RCRD: CPT | Performed by: NURSE PRACTITIONER

## 2025-03-18 PROCEDURE — 3079F DIAST BP 80-89 MM HG: CPT | Performed by: NURSE PRACTITIONER

## 2025-03-18 ASSESSMENT — SLEEP AND FATIGUE QUESTIONNAIRES
HOW LIKELY ARE YOU TO NOD OFF OR FALL ASLEEP WHEN YOU ARE A PASSENGER IN A CAR FOR AN HOUR WITHOUT A BREAK: SLIGHT CHANCE OF DOZING
DIFFICULTY_FALLING_ASLEEP: MILD
HOW LIKELY ARE YOU TO NOD OFF OR FALL ASLEEP WHILE SITTING QUIETLY AFTER LUNCH WITHOUT ALCOHOL: WOULD NEVER DOZE
WORRIED_DISTRESSED_DUE_TO_SLEEP: A LITTLE
SITING INACTIVE IN A PUBLIC PLACE LIKE A CLASS ROOM OR A MOVIE THEATER: WOULD NEVER DOZE
HOW LIKELY ARE YOU TO NOD OFF OR FALL ASLEEP WHILE SITTING AND TALKING TO SOMEONE: WOULD NEVER DOZE
DIFFICULTY_STAYING_ASLEEP: MILD
ESS-CHAD TOTAL SCORE: 7
HOW LIKELY ARE YOU TO NOD OFF OR FALL ASLEEP IN A CAR, WHILE STOPPED FOR A FEW MINUTES IN TRAFFIC: WOULD NEVER DOZE
SLEEP_PROBLEM_NOTICEABLE_TO_OTHERS: A LITTLE
HOW LIKELY ARE YOU TO NOD OFF OR FALL ASLEEP WHILE LYING DOWN TO REST IN THE AFTERNOON WHEN CIRCUMSTANCES PERMIT: HIGH CHANCE OF DOZING
SLEEP_PROBLEM_INTERFERES_DAILY_ACTIVITIES: A LITTLE
HOW LIKELY ARE YOU TO NOD OFF OR FALL ASLEEP WHILE SITTING AND READING: WOULD NEVER DOZE
SATISFACTION_WITH_CURRENT_SLEEP_PATTERN: SATISFIED
HOW LIKELY ARE YOU TO NOD OFF OR FALL ASLEEP WHILE WATCHING TV: HIGH CHANCE OF DOZING

## 2025-03-18 ASSESSMENT — ENCOUNTER SYMPTOMS
OCCASIONAL FEELINGS OF UNSTEADINESS: 0
LOSS OF SENSATION IN FEET: 0
DEPRESSION: 0

## 2025-03-18 ASSESSMENT — COLUMBIA-SUICIDE SEVERITY RATING SCALE - C-SSRS
1. IN THE PAST MONTH, HAVE YOU WISHED YOU WERE DEAD OR WISHED YOU COULD GO TO SLEEP AND NOT WAKE UP?: NO
2. HAVE YOU ACTUALLY HAD ANY THOUGHTS OF KILLING YOURSELF?: NO
6. HAVE YOU EVER DONE ANYTHING, STARTED TO DO ANYTHING, OR PREPARED TO DO ANYTHING TO END YOUR LIFE?: NO

## 2025-03-19 PROBLEM — E66.811 OBESITY (BMI 30.0-34.9): Status: ACTIVE | Noted: 2025-03-19

## 2025-03-26 ENCOUNTER — PROCEDURE VISIT (OUTPATIENT)
Dept: SLEEP MEDICINE | Facility: HOSPITAL | Age: 75
End: 2025-03-26
Payer: MEDICARE

## 2025-03-26 DIAGNOSIS — G47.33 OSA (OBSTRUCTIVE SLEEP APNEA): ICD-10-CM

## 2025-03-26 PROCEDURE — 95806 SLEEP STUDY UNATT&RESP EFFT: CPT | Performed by: STUDENT IN AN ORGANIZED HEALTH CARE EDUCATION/TRAINING PROGRAM

## 2025-03-31 ENCOUNTER — APPOINTMENT (OUTPATIENT)
Dept: PRIMARY CARE | Facility: CLINIC | Age: 75
End: 2025-03-31
Payer: COMMERCIAL

## 2025-04-07 ENCOUNTER — HOSPITAL ENCOUNTER (OUTPATIENT)
Dept: RADIOLOGY | Facility: CLINIC | Age: 75
Discharge: HOME | End: 2025-04-07
Payer: MEDICARE

## 2025-04-07 VITALS — HEIGHT: 65 IN | WEIGHT: 182 LBS | BODY MASS INDEX: 30.32 KG/M2

## 2025-04-07 DIAGNOSIS — Z12.31 VISIT FOR SCREENING MAMMOGRAM: ICD-10-CM

## 2025-04-07 PROCEDURE — 77063 BREAST TOMOSYNTHESIS BI: CPT | Performed by: RADIOLOGY

## 2025-04-07 PROCEDURE — 77067 SCR MAMMO BI INCL CAD: CPT | Performed by: RADIOLOGY

## 2025-04-07 PROCEDURE — 77063 BREAST TOMOSYNTHESIS BI: CPT

## 2025-05-20 ENCOUNTER — APPOINTMENT (OUTPATIENT)
Facility: CLINIC | Age: 75
End: 2025-05-20
Payer: COMMERCIAL

## 2025-05-21 ENCOUNTER — APPOINTMENT (OUTPATIENT)
Dept: PRIMARY CARE | Facility: CLINIC | Age: 75
End: 2025-05-21
Payer: MEDICARE

## 2025-05-21 VITALS
WEIGHT: 181.6 LBS | HEIGHT: 65 IN | HEART RATE: 69 BPM | SYSTOLIC BLOOD PRESSURE: 120 MMHG | OXYGEN SATURATION: 95 % | BODY MASS INDEX: 30.26 KG/M2 | DIASTOLIC BLOOD PRESSURE: 77 MMHG | TEMPERATURE: 97.9 F

## 2025-05-21 DIAGNOSIS — M51.369 DEGENERATION OF INTERVERTEBRAL DISC OF LUMBAR REGION, UNSPECIFIED WHETHER PAIN PRESENT: ICD-10-CM

## 2025-05-21 DIAGNOSIS — Z78.0 ASYMPTOMATIC MENOPAUSE: ICD-10-CM

## 2025-05-21 DIAGNOSIS — R73.01 IMPAIRED FASTING GLUCOSE: ICD-10-CM

## 2025-05-21 DIAGNOSIS — I10 BENIGN ESSENTIAL HYPERTENSION: ICD-10-CM

## 2025-05-21 DIAGNOSIS — D12.6 TUBULAR ADENOMA OF COLON: ICD-10-CM

## 2025-05-21 DIAGNOSIS — Z00.00 ANNUAL PHYSICAL EXAM: ICD-10-CM

## 2025-05-21 DIAGNOSIS — Z12.11 SCREENING FOR COLON CANCER: ICD-10-CM

## 2025-05-21 DIAGNOSIS — E66.811 OBESITY (BMI 30.0-34.9): ICD-10-CM

## 2025-05-21 DIAGNOSIS — Z00.00 ROUTINE GENERAL MEDICAL EXAMINATION AT HEALTH CARE FACILITY: ICD-10-CM

## 2025-05-21 DIAGNOSIS — K21.9 GASTROESOPHAGEAL REFLUX DISEASE WITHOUT ESOPHAGITIS: ICD-10-CM

## 2025-05-21 DIAGNOSIS — I35.1 NONRHEUMATIC AORTIC VALVE INSUFFICIENCY: ICD-10-CM

## 2025-05-21 DIAGNOSIS — E04.1 SOLITARY THYROID NODULE: ICD-10-CM

## 2025-05-21 DIAGNOSIS — Z00.00 MEDICARE ANNUAL WELLNESS VISIT, SUBSEQUENT: Primary | ICD-10-CM

## 2025-05-21 DIAGNOSIS — E78.5 DYSLIPIDEMIA, GOAL LDL BELOW 130: ICD-10-CM

## 2025-05-21 PROCEDURE — 1170F FXNL STATUS ASSESSED: CPT | Performed by: INTERNAL MEDICINE

## 2025-05-21 PROCEDURE — G0444 DEPRESSION SCREEN ANNUAL: HCPCS | Performed by: INTERNAL MEDICINE

## 2025-05-21 PROCEDURE — 99214 OFFICE O/P EST MOD 30 MIN: CPT | Performed by: INTERNAL MEDICINE

## 2025-05-21 PROCEDURE — 99397 PER PM REEVAL EST PAT 65+ YR: CPT | Performed by: INTERNAL MEDICINE

## 2025-05-21 PROCEDURE — 3074F SYST BP LT 130 MM HG: CPT | Performed by: INTERNAL MEDICINE

## 2025-05-21 PROCEDURE — G0447 BEHAVIOR COUNSEL OBESITY 15M: HCPCS | Performed by: INTERNAL MEDICINE

## 2025-05-21 PROCEDURE — 1159F MED LIST DOCD IN RCRD: CPT | Performed by: INTERNAL MEDICINE

## 2025-05-21 PROCEDURE — G0439 PPPS, SUBSEQ VISIT: HCPCS | Performed by: INTERNAL MEDICINE

## 2025-05-21 PROCEDURE — 1160F RVW MEDS BY RX/DR IN RCRD: CPT | Performed by: INTERNAL MEDICINE

## 2025-05-21 PROCEDURE — 3078F DIAST BP <80 MM HG: CPT | Performed by: INTERNAL MEDICINE

## 2025-05-21 PROCEDURE — G0446 INTENS BEHAVE THER CARDIO DX: HCPCS | Performed by: INTERNAL MEDICINE

## 2025-05-21 ASSESSMENT — ACTIVITIES OF DAILY LIVING (ADL)
DRESSING: INDEPENDENT
BATHING: INDEPENDENT
GROCERY_SHOPPING: INDEPENDENT
MANAGING_FINANCES: INDEPENDENT
DOING_HOUSEWORK: INDEPENDENT
TAKING_MEDICATION: INDEPENDENT

## 2025-05-21 NOTE — ASSESSMENT & PLAN NOTE
Complete physical examination completed today.  Advised to keep a heart healthy, low-fat diet as recommended diet is the Mediterranean diet.  Advised to exercise regularly for 30 minutes daily 5 days a week and maintain 150 minutes of exercise per week.  Discussed age-appropriate cancer screening,Immunization and recommendation were given.  Advised on regular eye and dental visit.  Advised on staying well-hydrated.

## 2025-05-21 NOTE — PROGRESS NOTES
Subjective   Reason for Visit: Brigitte Lovell is an 74 y.o. female here for a Medicare Wellness visit.     Past Medical, Surgical, and Family History reviewed and updated in chart.         Patient is here for MCR,CPE and  to f/u on HTN,HLD ,IFG,sleep apnea,OA, had recent sleep study but told was mild no need for treatment,GERD,h/o colon adenoma,pt sees,derm ,she see Dr Carlos Aguilar for her thyroid nodule and had Bx  at [facility],she is seeing a new endo in 1/2026,but she is due for thyroid ultrasound in July 2025, I put an order for her .  She has been exercising regularly.no CP, in past,she saw Dr. Welch, cardiologist and had negative stress test, plan to recheck her echocardiogram in 2026 due to her mild aortic stenosis and mild to moderate aortic regurgitation  she takes her meds regularly, no side effects.  She c/o her usual pain left low back/left posterior hip ,on Mobic as needed,sometimes she takes Advil dual action ,no leg pain or numbness,no injury. Her XRAY showed Spondylolythesis,grade 1,she has been doing some kind of therapy and feeling better.  She continues to complain of constipation, not responding completely to Dulcolax and high fiber diet,she did not like Miralax)caused pasty stools), she had history of polyp was due for colonoscopy March 2026(5 years interval), but I will order it for this year  Last colonoscopy 3/18/2021,next in 5 years which will be 3/18/2026, due to polyps.   BI MAMMOGRAM: 4/7/2025   BONE DENSITY: 3/24/2022        Patient Care Team:  Penny Jackson MD as PCP - General  Penny Jackson MD as PCP - [employer] PCP     Review of Systems   Constitutional: Negative.    HENT: Negative.     Eyes: Negative.    Respiratory: Negative.     Cardiovascular: Negative.    Gastrointestinal:  Positive for constipation. Negative for abdominal pain, blood in stool, nausea and rectal pain.        As HPI   Genitourinary: Negative.    Musculoskeletal:         As HPI   Neurological:  "Negative.    Hematological: Negative.    Psychiatric/Behavioral: Negative.         Objective   Vitals:  /77 (BP Location: Left arm, Patient Position: Sitting, BP Cuff Size: Adult)   Pulse 69   Temp 36.6 °C (97.9 °F) (Temporal)   Ht 1.645 m (5' 4.75\")   Wt 82.4 kg (181 lb 9.6 oz)   SpO2 95%   BMI 30.45 kg/m²       Physical Exam  Vitals reviewed.   Constitutional:       General: She is not in acute distress.     Appearance: Normal appearance. She is obese.   HENT:      Head: Normocephalic and atraumatic.      Right Ear: Tympanic membrane and ear canal normal.      Left Ear: Tympanic membrane and ear canal normal.      Mouth/Throat:      Mouth: Mucous membranes are moist.      Pharynx: No oropharyngeal exudate or posterior oropharyngeal erythema.   Eyes:      General: No scleral icterus.     Extraocular Movements: Extraocular movements intact.      Conjunctiva/sclera: Conjunctivae normal.      Pupils: Pupils are equal, round, and reactive to light.   Cardiovascular:      Rate and Rhythm: Normal rate and regular rhythm.      Pulses: Normal pulses.      Heart sounds: Normal heart sounds. No murmur heard.     Comments: 1/6 systolic murmur at aortic area with radiation to neck.  Pulmonary:      Effort: Pulmonary effort is normal. No respiratory distress.      Breath sounds: Normal breath sounds. No wheezing or rales.   Chest:   Breasts:     Right: Normal.      Left: Normal.   Abdominal:      General: Abdomen is flat. Bowel sounds are normal. There is no distension.      Palpations: Abdomen is soft. There is no mass.      Tenderness: There is no abdominal tenderness. There is no right CVA tenderness or left CVA tenderness.   Musculoskeletal:         General: Normal range of motion.      Cervical back: Normal range of motion and neck supple.      Right lower leg: No edema.      Left lower leg: No edema.   Lymphadenopathy:      Cervical: No cervical adenopathy.      Upper Body:      Right upper body: No " supraclavicular or axillary adenopathy.      Left upper body: No supraclavicular or axillary adenopathy.   Skin:     General: Skin is warm.   Neurological:      General: No focal deficit present.      Mental Status: She is alert and oriented to person, place, and time.      Cranial Nerves: No cranial nerve deficit.      Motor: No weakness.      Coordination: Coordination normal.      Deep Tendon Reflexes: Reflexes normal.   Psychiatric:         Mood and Affect: Mood normal.         Assessment & Plan  Routine general medical examination at health care facility    Orders:    1 Year Follow Up In [facility] - PCP - Wellness Exam; Future    Annual physical exam  Complete physical examination completed today.  Advised to keep a heart healthy, low-fat diet as recommended diet is the Mediterranean diet.  Advised to exercise regularly for 30 minutes daily 5 days a week and maintain 150 minutes of exercise per week.  Discussed age-appropriate cancer screening,Immunization and recommendation were given.  Advised on regular eye and dental visit.  Advised on staying well-hydrated.       Medicare annual wellness visit, subsequent  Spent 5 minutes screening for depression including PHQ-2 no depressed mood noted       Gastroesophageal reflux disease without esophagitis  Avoid offending food       Solitary thyroid nodule  Ordered thyroid ultrasound for August 2025  She is seeing a new endocrinologist in January 2026.    Orders:    US thyroid; Future    Asymptomatic menopause    Orders:    XR DEXA bone density; Future    Tubular adenoma of colon  She was due March 2026 but due to her persistent  constipation, we will order colonoscopy for this year, she will continue high-fiber diet, take MiraLAX and stool softener.         Screening for colon cancer    Orders:    Colonoscopy Screening; High Risk Patient; h/o colon adenoma,constipation; Future    Impaired fasting glucose  Order labs  Follow 1800 nicole ADA diet.    Orders:     Comprehensive metabolic panel; Future    Hemoglobin A1C; Future    Nonrheumatic aortic valve insufficiency  Seen by cardiologist Dr. Welch         Dyslipidemia, goal LDL below 130  Continue statin and low-fat diet order labs.  I spent 50 minutes discussing her cardiovascular risk and behavior therapies of nutritional choices,exercise, and elimination of habits contributing to risk. we agreed on a plan how they may be able to reduce their current cardiovascular risk. For patient with risk calculation > 10 %, Aspirin use was discussed and encouraged unless known allergy or increased risk of bleeding contraindicated use. Patient 10 year CV risk estimate calculates: 18.8%.  She was seen by cardiologist she will continue statin, LDL at goal.         Benign essential hypertension  Stable on lisinopril low-sodium diet.         Obesity (BMI 30.0-34.9)  I spent >15 minutes face to face with individual providing recommendations for nutrition choices and exercise plan to help achieve weight reduction.         Degeneration of intervertebral disc of lumbar region, unspecified whether pain present

## 2025-05-22 PROBLEM — Z78.0 ASYMPTOMATIC MENOPAUSE: Status: ACTIVE | Noted: 2025-05-22

## 2025-05-22 PROBLEM — E66.09 CLASS 1 OBESITY DUE TO EXCESS CALORIES WITH BODY MASS INDEX (BMI) OF 30.0 TO 30.9 IN ADULT: Status: ACTIVE | Noted: 2025-05-22

## 2025-05-22 PROBLEM — E66.811 CLASS 1 OBESITY DUE TO EXCESS CALORIES WITH BODY MASS INDEX (BMI) OF 30.0 TO 30.9 IN ADULT: Status: ACTIVE | Noted: 2025-05-22

## 2025-05-22 PROBLEM — Z12.11 SCREENING FOR COLON CANCER: Status: ACTIVE | Noted: 2025-05-22

## 2025-05-22 ASSESSMENT — ENCOUNTER SYMPTOMS
PSYCHIATRIC NEGATIVE: 1
BLOOD IN STOOL: 0
CONSTIPATION: 1
ABDOMINAL PAIN: 0
RECTAL PAIN: 0
ROS GI COMMENTS: AS HPI
RESPIRATORY NEGATIVE: 1
NAUSEA: 0
CARDIOVASCULAR NEGATIVE: 1
EYES NEGATIVE: 1
NEUROLOGICAL NEGATIVE: 1
CONSTITUTIONAL NEGATIVE: 1
HEMATOLOGIC/LYMPHATIC NEGATIVE: 1

## 2025-05-23 NOTE — ASSESSMENT & PLAN NOTE
Ordered thyroid ultrasound for August 2025  She is seeing a new endocrinologist in January 2026.    Orders:    US thyroid; Future

## 2025-05-23 NOTE — ASSESSMENT & PLAN NOTE
She was due March 2026 but due to her persistent  constipation, we will order colonoscopy for this year, she will continue high-fiber diet, take MiraLAX and stool softener.

## 2025-05-23 NOTE — ASSESSMENT & PLAN NOTE
Continue statin and low-fat diet order labs.  I spent 50 minutes discussing her cardiovascular risk and behavior therapies of nutritional choices,exercise, and elimination of habits contributing to risk. we agreed on a plan how they may be able to reduce their current cardiovascular risk. For patient with risk calculation > 10 %, Aspirin use was discussed and encouraged unless known allergy or increased risk of bleeding contraindicated use. Patient 10 year CV risk estimate calculates: 18.8%.  She was seen by cardiologist she will continue statin, LDL at goal.

## 2025-05-23 NOTE — ASSESSMENT & PLAN NOTE
Order labs  Follow 1800 nicole ADA diet.    Orders:    Comprehensive metabolic panel; Future    Hemoglobin A1C; Future

## 2025-06-03 ENCOUNTER — APPOINTMENT (OUTPATIENT)
Dept: RADIOLOGY | Facility: CLINIC | Age: 75
End: 2025-06-03
Payer: MEDICARE

## 2025-06-16 ENCOUNTER — APPOINTMENT (OUTPATIENT)
Dept: RADIOLOGY | Facility: CLINIC | Age: 75
End: 2025-06-16
Payer: MEDICARE

## 2025-06-16 DIAGNOSIS — Z78.0 ASYMPTOMATIC MENOPAUSE: ICD-10-CM

## 2025-06-16 PROCEDURE — 77080 DXA BONE DENSITY AXIAL: CPT | Performed by: STUDENT IN AN ORGANIZED HEALTH CARE EDUCATION/TRAINING PROGRAM

## 2025-06-16 PROCEDURE — 77080 DXA BONE DENSITY AXIAL: CPT

## 2025-06-24 ENCOUNTER — APPOINTMENT (OUTPATIENT)
Dept: GASTROENTEROLOGY | Facility: CLINIC | Age: 75
End: 2025-06-24
Payer: MEDICARE

## 2025-07-01 ENCOUNTER — TELEPHONE (OUTPATIENT)
Dept: GASTROENTEROLOGY | Facility: CLINIC | Age: 75
End: 2025-07-01
Payer: MEDICARE

## 2025-07-01 NOTE — TELEPHONE ENCOUNTER
Called the patient letting them know that their appointment on 7/22/2025 with Dr. Barreto was cancelled. Patient called right back, and the appointment was rescheduled with KARLO Man.

## 2025-07-11 NOTE — PROGRESS NOTES
CC: Patient was a referral from Dr Jackson for Colon consult     History of Present Illness:   Brigitte Lovell is a 74 y.o. 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  BM  once daily to every other  day   soft /solid/brown. Takes dulcolax  with good effects.  She reports history of colon polyps, in she is due for colonoscopy in 2026 however due to constipation for few months, wants to get evaluation with colonoscopy.  Denies hematochezia, melena  Denies abdominal pain, nausea, vomiting, heartburn, acid reflux, rare Dysphagia.   Sometimes  diarrhea.    Denies weight loss or appetite change.    On aspirin. Meloxicam as need. Denies use of other blood thinners.  Denies any recent change in medication or use of new medication.    May be cousin had colon caner, not sure. Otherwise Denies family history of colon cancer, celiac disease, inflammatory bowel disease.  Social  alcohol use, denies smoking, denies illicit drug use.    H/o Abdominal surgery  -Cholecystectomy   Review of Systems  ROS Negative unless otherwise stated above.      Past Medical/Surgical History  Medical History[1]   Surgical History[2]     Social History   reports that she has never smoked. She has never used smokeless tobacco. She reports current alcohol use of about 2.0 standard drinks of alcohol per week. She reports that she does not use drugs.     Family History  family history includes triple bypass in her father.     Allergies  RX Allergies[3]    Medications  Current Outpatient Medications   Medication Instructions    aspirin (MARJORIE LOW DOSE ASPIRIN) 81 mg, Daily    atorvastatin (LIPITOR) 20 mg, oral, Daily    lisinopril 20 mg, oral, Daily    meloxicam (Mobic) 7.5 mg tablet TAKE ONE TABLET BY MOUTH DAILY WITH FOOD AS NEEDED     multivit-min/iron/folic/lutein (CENTRUM SILVER WOMEN ORAL) 1 tablet, Daily    tretinoin (Retin-A) 0.025 % cream APPLY A THIN LAYER TO FACE EVERY NIGHT AT BEDTIME. START WITH ONCE A WEEK AND INCREASE AS TOLERATED    triamcinolone (Kenalog) 0.025 % cream 1 Application        Objective   Visit Vitals  /83 (BP Location: Left arm, Patient Position: Sitting)   Pulse 64      Physical Exam   General: A&Ox3, NAD.  HEENT: No scleral icterus, no conjunctival pallor, normocephalic, atraumatic  Neck:  atraumatic, trachea midline, no JVD   CV: RRR. Positive S1/S2.   Resp: CTA bilaterally. No wheezing, rhonchi or rales.   GI: BSx4. no distension, Soft, non-tender to palpation, no rebound tenderness, no guarding, no rigidity, no discernible ascites   Extremities: no lower extremity edema.  Neuro: No focal deficits. no asterixis  Psych: pleasant and cooperative.  Skin:  Warm and dry, no jaundice      Lab Results   Component Value Date    WBC 4.9 01/29/2025    HGB 14.6 01/29/2025    HCT 44.4 01/29/2025    MCV 94.5 01/29/2025     01/29/2025       Chemistry    Lab Results   Component Value Date/Time     01/29/2025 0638    K 4.8 01/29/2025 0638     01/29/2025 0638    CO2 25 01/29/2025 0638    BUN 19 01/29/2025 0638    CREATININE 0.89 01/29/2025 0638    Lab Results   Component Value Date/Time    CALCIUM 9.5 01/29/2025 0638    ALKPHOS 107 01/29/2025 0638    AST 15 01/29/2025 0638    ALT 20 01/29/2025 0638    BILITOT 0.5 01/29/2025 0638        Colonoscopy: 3/18/2021 Impression:            - Preparation of the colon was fair.                         - One 3 mm polyp in the proximal ascending colon,                          removed with a cold snare. Resected and retrieved.                         - One 30 mm polyp in the rectum.                         - Diverticulosis in the sigmoid colon.  Repeat colonoscopy in 5 years for surveillance.       Assessment & Plan  History of colon polyps  Colonoscopy: 3/18/2021    We discussed the risks associated with a EGD/colonoscopy including the risk of bleeding, infection, perforation, internal hemorrhage, heart or lung complications.  Patient demonstrated understanding of the associated risks and willingness to proceed.      Constipation, unspecified constipation type  For few months.   Take Miralax  Daily   Continue Dulcolax  -Take Metamucil Daily.   Diet and lifestyle modification as discussed   Further evaluation with colonoscopy.      Follow-up 6 to 8 weeks after colonoscopy.    Magda Haile, CHERELLE-CNP    The note was created using voice recognition transcription software. Despite proofreading, unintentional typographical errors may be present. Please contact the GI office with any questions or concerns.          [1]   Past Medical History:  Diagnosis Date    Colon polyp     Contact with and (suspected) exposure to covid-19 2021    Suspected COVID-19 virus infection    Deficiency of other specified B group vitamins 2018    Vitamin B12 deficiency    Deficiency of other specified B group vitamins     Vitamin B12 deficiency    Diverticulosis     Encounter for general adult medical examination without abnormal findings 12/15/2020    Medicare annual wellness visit, subsequent    Hyperkalemia 2023    Hypertension     Nontoxic multinodular goiter 2022    Multinodular thyroid    Nontoxic single thyroid nodule 2021    Solitary thyroid nodule    Other conditions influencing health status 05/10/2022    History of cough    Personal history of other diseases of the circulatory system     History of hypertension    Personal history of other diseases of the nervous system and sense organs     History of glaucoma    Personal history of other endocrine, nutritional and metabolic disease     History of hyperlipidemia    Positive colorectal cancer screening using Cologuard test 2023   [2]   Past Surgical History:  Procedure Laterality Date      SECTION, LOW TRANSVERSE  3/22/1983    CHOLECYSTECTOMY  12/27/2012    Cholecystectomy    THYROID SURGERY  04/13/2022    Thyroid Surgery    US GUIDED THYROID BIOPSY  10/10/2023    US GUIDED THYROID BIOPSY 10/10/2023 Harry Sanchez, APRN-CNP [facility]   [3] No Known Allergies

## 2025-07-14 ENCOUNTER — APPOINTMENT (OUTPATIENT)
Dept: GASTROENTEROLOGY | Facility: CLINIC | Age: 75
End: 2025-07-14
Payer: MEDICARE

## 2025-07-14 VITALS
DIASTOLIC BLOOD PRESSURE: 83 MMHG | SYSTOLIC BLOOD PRESSURE: 129 MMHG | WEIGHT: 180 LBS | BODY MASS INDEX: 29.99 KG/M2 | HEART RATE: 64 BPM | HEIGHT: 65 IN

## 2025-07-14 DIAGNOSIS — Z86.0100 HISTORY OF COLON POLYPS: Primary | ICD-10-CM

## 2025-07-14 DIAGNOSIS — K59.00 CONSTIPATION, UNSPECIFIED CONSTIPATION TYPE: ICD-10-CM

## 2025-07-14 PROCEDURE — 1159F MED LIST DOCD IN RCRD: CPT | Performed by: NURSE PRACTITIONER

## 2025-07-14 PROCEDURE — 1036F TOBACCO NON-USER: CPT | Performed by: NURSE PRACTITIONER

## 2025-07-14 PROCEDURE — 3079F DIAST BP 80-89 MM HG: CPT | Performed by: NURSE PRACTITIONER

## 2025-07-14 PROCEDURE — 99203 OFFICE O/P NEW LOW 30 MIN: CPT | Performed by: NURSE PRACTITIONER

## 2025-07-14 PROCEDURE — 3074F SYST BP LT 130 MM HG: CPT | Performed by: NURSE PRACTITIONER

## 2025-07-14 RX ORDER — SODIUM, POTASSIUM,MAG SULFATES 17.5-3.13G
1 SOLUTION, RECONSTITUTED, ORAL ORAL 2 TIMES DAILY
Qty: 1 EACH | Refills: 0 | Status: SHIPPED | OUTPATIENT
Start: 2025-07-14 | End: 2025-07-15

## 2025-07-14 NOTE — PATIENT INSTRUCTIONS
For constipation  -Take Miralax  Daily   -Take Metamucil Daily.   -Increase water intake and physical activity as tolerated   -Increase fiber intake (fruits such as strawberries, raspberries, and apples - vegetables such as broccoli, lima beans, and carrots - whole-grain breads such as seven-grain, cracked wheat, or pumpernickel). Add fiber foods slowly not all at once!  -Change your bathroom posture:  Try Toilet footstools or Squatty Potty  -Try to have Bowel movement at the same time every day (such as in the morning at home, after you eat breakfast). Try to not hold it in or put off a bowel movement    For colonoscopy  Take MiraLAX one dose every day for 5 days before the procedure, to make sure that your prep is clean.  To prevent constipation, take high to adequate fibers in diet, stay hydrated, and stay physically active.  you can mix your prep with Crystal light, refrigerate for better taste, drink slowly, can use a straw.  we can send you some nausea medications as need if not able to tolerate the prep.  Please avoid smoking, vaping, alcohol, marijuana at least in the last 3 days before your procedure to decrease the risk of complications. If you have sleep apnea, compliance to CPAP machine can decrease the risk of complications as well.

## 2025-07-22 ENCOUNTER — APPOINTMENT (OUTPATIENT)
Dept: GASTROENTEROLOGY | Facility: CLINIC | Age: 75
End: 2025-07-22
Payer: MEDICARE

## 2025-08-04 ENCOUNTER — HOSPITAL ENCOUNTER (OUTPATIENT)
Dept: RADIOLOGY | Facility: CLINIC | Age: 75
End: 2025-08-04
Payer: MEDICARE

## 2025-08-04 ENCOUNTER — APPOINTMENT (OUTPATIENT)
Dept: RADIOLOGY | Facility: CLINIC | Age: 75
End: 2025-08-04
Payer: MEDICARE

## 2025-08-04 DIAGNOSIS — E04.1 SOLITARY THYROID NODULE: ICD-10-CM

## 2025-08-04 PROCEDURE — 76536 US EXAM OF HEAD AND NECK: CPT | Performed by: RADIOLOGY

## 2025-08-04 PROCEDURE — 76536 US EXAM OF HEAD AND NECK: CPT

## 2025-08-14 ENCOUNTER — APPOINTMENT (OUTPATIENT)
Dept: ENDOCRINOLOGY | Facility: CLINIC | Age: 75
End: 2025-08-14
Payer: MEDICARE

## 2025-08-19 ENCOUNTER — APPOINTMENT (OUTPATIENT)
Dept: GASTROENTEROLOGY | Facility: CLINIC | Age: 75
End: 2025-08-19
Payer: MEDICARE

## 2025-08-21 DIAGNOSIS — R73.01 IMPAIRED FASTING GLUCOSE: ICD-10-CM

## 2025-09-02 ENCOUNTER — APPOINTMENT (OUTPATIENT)
Dept: CARDIOLOGY | Facility: CLINIC | Age: 75
End: 2025-09-02
Payer: MEDICARE

## 2025-09-03 ENCOUNTER — APPOINTMENT (OUTPATIENT)
Dept: CARDIOLOGY | Facility: CLINIC | Age: 75
End: 2025-09-03
Payer: MEDICARE

## 2025-09-04 ENCOUNTER — OFFICE VISIT (OUTPATIENT)
Dept: CARDIOLOGY | Facility: CLINIC | Age: 75
End: 2025-09-04
Payer: MEDICARE

## 2025-09-04 VITALS
DIASTOLIC BLOOD PRESSURE: 74 MMHG | WEIGHT: 178 LBS | BODY MASS INDEX: 29.66 KG/M2 | HEIGHT: 65 IN | HEART RATE: 75 BPM | SYSTOLIC BLOOD PRESSURE: 120 MMHG

## 2025-09-04 DIAGNOSIS — I35.0 NONRHEUMATIC AORTIC VALVE STENOSIS: ICD-10-CM

## 2025-09-04 DIAGNOSIS — I35.1 NONRHEUMATIC AORTIC VALVE INSUFFICIENCY: ICD-10-CM

## 2025-09-04 PROCEDURE — 99212 OFFICE O/P EST SF 10 MIN: CPT

## 2025-09-04 PROCEDURE — 99213 OFFICE O/P EST LOW 20 MIN: CPT | Performed by: INTERNAL MEDICINE

## 2025-09-04 PROCEDURE — 3078F DIAST BP <80 MM HG: CPT | Performed by: INTERNAL MEDICINE

## 2025-09-04 PROCEDURE — 3074F SYST BP LT 130 MM HG: CPT | Performed by: INTERNAL MEDICINE

## 2025-09-04 PROCEDURE — 1159F MED LIST DOCD IN RCRD: CPT | Performed by: INTERNAL MEDICINE

## 2025-09-18 ENCOUNTER — APPOINTMENT (OUTPATIENT)
Dept: GASTROENTEROLOGY | Facility: EXTERNAL LOCATION | Age: 75
End: 2025-09-18
Payer: MEDICARE

## 2025-10-31 ENCOUNTER — APPOINTMENT (OUTPATIENT)
Dept: PRIMARY CARE | Facility: CLINIC | Age: 75
End: 2025-10-31
Payer: MEDICARE

## 2026-01-22 ENCOUNTER — APPOINTMENT (OUTPATIENT)
Facility: CLINIC | Age: 76
End: 2026-01-22
Payer: MEDICARE

## 2026-01-23 ENCOUNTER — APPOINTMENT (OUTPATIENT)
Dept: ENDOCRINOLOGY | Facility: CLINIC | Age: 76
End: 2026-01-23
Payer: MEDICARE

## 2026-05-22 ENCOUNTER — APPOINTMENT (OUTPATIENT)
Dept: PRIMARY CARE | Facility: CLINIC | Age: 76
End: 2026-05-22
Payer: MEDICARE

## 2026-09-10 ENCOUNTER — APPOINTMENT (OUTPATIENT)
Dept: CARDIOLOGY | Facility: CLINIC | Age: 76
End: 2026-09-10
Payer: MEDICARE